# Patient Record
Sex: FEMALE | Race: BLACK OR AFRICAN AMERICAN | Employment: UNEMPLOYED | URBAN - METROPOLITAN AREA
[De-identification: names, ages, dates, MRNs, and addresses within clinical notes are randomized per-mention and may not be internally consistent; named-entity substitution may affect disease eponyms.]

---

## 2017-06-22 ENCOUNTER — HOSPITAL ENCOUNTER (OUTPATIENT)
Age: 52
Setting detail: SPECIMEN
Discharge: HOME | End: 2017-06-22
Payer: COMMERCIAL

## 2017-06-22 ENCOUNTER — OFFICE VISIT (OUTPATIENT)
Dept: INTERNAL MEDICINE | Age: 52
End: 2017-06-22
Payer: COMMERCIAL

## 2017-06-22 VITALS
SYSTOLIC BLOOD PRESSURE: 104 MMHG | WEIGHT: 219 LBS | BODY MASS INDEX: 33.19 KG/M2 | HEIGHT: 68 IN | DIASTOLIC BLOOD PRESSURE: 70 MMHG | HEART RATE: 77 BPM

## 2017-06-22 DIAGNOSIS — G47.09 OTHER INSOMNIA: ICD-10-CM

## 2017-06-22 DIAGNOSIS — F32.0 MILD MAJOR DEPRESSION (HCC): Primary | ICD-10-CM

## 2017-06-22 DIAGNOSIS — R79.89 LOW VITAMIN D LEVEL: ICD-10-CM

## 2017-06-22 DIAGNOSIS — Z13.31 POSITIVE DEPRESSION SCREENING: ICD-10-CM

## 2017-06-22 DIAGNOSIS — Z12.39 BREAST CANCER SCREENING: ICD-10-CM

## 2017-06-22 DIAGNOSIS — E78.00 PURE HYPERCHOLESTEROLEMIA: Chronic | ICD-10-CM

## 2017-06-22 DIAGNOSIS — N91.2 AMENORRHEA: ICD-10-CM

## 2017-06-22 DIAGNOSIS — F41.9 ANXIETY: ICD-10-CM

## 2017-06-22 LAB
ALBUMIN SERPL-MCNC: 4.2 G/DL (ref 3.5–5.2)
ALBUMIN/GLOBULIN RATIO: 1.1 (ref 1–2.5)
ALP BLD-CCNC: 86 U/L (ref 35–104)
ALT SERPL-CCNC: 9 U/L (ref 5–33)
ANION GAP SERPL CALCULATED.3IONS-SCNC: 14 MMOL/L (ref 9–17)
AST SERPL-CCNC: 17 U/L
BILIRUB SERPL-MCNC: 0.42 MG/DL (ref 0.3–1.2)
BUN BLDV-MCNC: 11 MG/DL (ref 6–20)
BUN/CREAT BLD: ABNORMAL (ref 9–20)
CALCIUM SERPL-MCNC: 9.1 MG/DL (ref 8.6–10.4)
CHLORIDE BLD-SCNC: 101 MMOL/L (ref 98–107)
CHOLESTEROL/HDL RATIO: 2.3
CHOLESTEROL: 200 MG/DL
CO2: 23 MMOL/L (ref 20–31)
CONTROL: NORMAL
CREAT SERPL-MCNC: 0.93 MG/DL (ref 0.5–0.9)
GFR AFRICAN AMERICAN: >60 ML/MIN
GFR NON-AFRICAN AMERICAN: >60 ML/MIN
GFR SERPL CREATININE-BSD FRML MDRD: ABNORMAL ML/MIN/{1.73_M2}
GFR SERPL CREATININE-BSD FRML MDRD: ABNORMAL ML/MIN/{1.73_M2}
GLUCOSE BLD-MCNC: 89 MG/DL (ref 70–99)
HDLC SERPL-MCNC: 86 MG/DL
LDL CHOLESTEROL: 99 MG/DL (ref 0–130)
POTASSIUM SERPL-SCNC: 4.3 MMOL/L (ref 3.7–5.3)
PREGNANCY TEST URINE, POC: NORMAL
SODIUM BLD-SCNC: 138 MMOL/L (ref 135–144)
TOTAL PROTEIN: 7.9 G/DL (ref 6.4–8.3)
TRIGL SERPL-MCNC: 77 MG/DL
VITAMIN D 25-HYDROXY: 28.6 NG/ML (ref 30–100)
VLDLC SERPL CALC-MCNC: ABNORMAL MG/DL (ref 1–30)

## 2017-06-22 PROCEDURE — 81025 URINE PREGNANCY TEST: CPT | Performed by: INTERNAL MEDICINE

## 2017-06-22 PROCEDURE — 80061 LIPID PANEL: CPT

## 2017-06-22 PROCEDURE — 82306 VITAMIN D 25 HYDROXY: CPT

## 2017-06-22 PROCEDURE — 99214 OFFICE O/P EST MOD 30 MIN: CPT | Performed by: INTERNAL MEDICINE

## 2017-06-22 PROCEDURE — 36415 COLL VENOUS BLD VENIPUNCTURE: CPT

## 2017-06-22 PROCEDURE — G8431 POS CLIN DEPRES SCRN F/U DOC: HCPCS | Performed by: INTERNAL MEDICINE

## 2017-06-22 PROCEDURE — 80053 COMPREHEN METABOLIC PANEL: CPT

## 2017-06-22 PROCEDURE — G0444 DEPRESSION SCREEN ANNUAL: HCPCS | Performed by: INTERNAL MEDICINE

## 2017-06-22 RX ORDER — FOLIC ACID 1 MG/1
1 TABLET ORAL DAILY
Qty: 30 TABLET | Refills: 5 | Status: SHIPPED | OUTPATIENT
Start: 2017-06-22 | End: 2018-10-02

## 2017-06-22 RX ORDER — PRAVASTATIN SODIUM 40 MG
40 TABLET ORAL DAILY
Qty: 30 TABLET | Refills: 6 | Status: SHIPPED | OUTPATIENT
Start: 2017-06-22 | End: 2017-10-04 | Stop reason: SDUPTHER

## 2017-06-22 RX ORDER — LORATADINE 10 MG/1
10 TABLET ORAL DAILY
Qty: 30 TABLET | Refills: 3 | Status: SHIPPED | OUTPATIENT
Start: 2017-06-22 | End: 2017-10-04 | Stop reason: SDUPTHER

## 2017-06-22 RX ORDER — ESCITALOPRAM OXALATE 20 MG/1
TABLET ORAL
Qty: 30 TABLET | Refills: 3 | Status: SHIPPED | OUTPATIENT
Start: 2017-06-22 | End: 2017-10-04 | Stop reason: SDUPTHER

## 2017-06-22 RX ORDER — LORATADINE AND PSEUDOEPHEDRINE 10; 240 MG/1; MG/1
1 TABLET, EXTENDED RELEASE ORAL DAILY
Qty: 30 TABLET | Refills: 3 | Status: CANCELLED | OUTPATIENT
Start: 2017-06-22

## 2017-06-22 RX ORDER — TRAZODONE HYDROCHLORIDE 100 MG/1
100 TABLET ORAL NIGHTLY
Qty: 30 TABLET | Refills: 3 | Status: SHIPPED | OUTPATIENT
Start: 2017-06-22 | End: 2017-10-04 | Stop reason: SDUPTHER

## 2017-06-22 ASSESSMENT — PATIENT HEALTH QUESTIONNAIRE - PHQ9
9. THOUGHTS THAT YOU WOULD BE BETTER OFF DEAD, OR OF HURTING YOURSELF: 0
10. IF YOU CHECKED OFF ANY PROBLEMS, HOW DIFFICULT HAVE THESE PROBLEMS MADE IT FOR YOU TO DO YOUR WORK, TAKE CARE OF THINGS AT HOME, OR GET ALONG WITH OTHER PEOPLE: 1
1. LITTLE INTEREST OR PLEASURE IN DOING THINGS: 0
7. TROUBLE CONCENTRATING ON THINGS, SUCH AS READING THE NEWSPAPER OR WATCHING TELEVISION: 3
5. POOR APPETITE OR OVEREATING: 0
6. FEELING BAD ABOUT YOURSELF - OR THAT YOU ARE A FAILURE OR HAVE LET YOURSELF OR YOUR FAMILY DOWN: 0
SUM OF ALL RESPONSES TO PHQ QUESTIONS 1-9: 9
4. FEELING TIRED OR HAVING LITTLE ENERGY: 3
SUM OF ALL RESPONSES TO PHQ9 QUESTIONS 1 & 2: 0
2. FEELING DOWN, DEPRESSED OR HOPELESS: 0
8. MOVING OR SPEAKING SO SLOWLY THAT OTHER PEOPLE COULD HAVE NOTICED. OR THE OPPOSITE, BEING SO FIGETY OR RESTLESS THAT YOU HAVE BEEN MOVING AROUND A LOT MORE THAN USUAL: 0
3. TROUBLE FALLING OR STAYING ASLEEP: 3

## 2017-06-25 ASSESSMENT — ENCOUNTER SYMPTOMS
SPUTUM PRODUCTION: 0
BLURRED VISION: 0
NAUSEA: 0
SHORTNESS OF BREATH: 0
ABDOMINAL PAIN: 0
CONSTIPATION: 0
COUGH: 0
BACK PAIN: 1
EYE REDNESS: 0

## 2017-08-26 ENCOUNTER — APPOINTMENT (OUTPATIENT)
Dept: GENERAL RADIOLOGY | Age: 52
End: 2017-08-26
Payer: COMMERCIAL

## 2017-08-26 ENCOUNTER — HOSPITAL ENCOUNTER (EMERGENCY)
Age: 52
Discharge: HOME | End: 2017-08-26
Payer: COMMERCIAL

## 2017-08-26 VITALS
BODY MASS INDEX: 32.89 KG/M2 | OXYGEN SATURATION: 100 % | SYSTOLIC BLOOD PRESSURE: 98 MMHG | RESPIRATION RATE: 20 BRPM | TEMPERATURE: 97.3 F | HEART RATE: 73 BPM | DIASTOLIC BLOOD PRESSURE: 64 MMHG | HEIGHT: 68 IN | WEIGHT: 217 LBS

## 2017-08-26 DIAGNOSIS — S93.401D SPRAIN OF RIGHT ANKLE, UNSPECIFIED LIGAMENT, SUBSEQUENT ENCOUNTER: Primary | ICD-10-CM

## 2017-08-26 PROCEDURE — 73630 X-RAY EXAM OF FOOT: CPT

## 2017-08-26 PROCEDURE — 6370000000 HC RX 637 (ALT 250 FOR IP)

## 2017-08-26 PROCEDURE — 73610 X-RAY EXAM OF ANKLE: CPT

## 2017-08-26 PROCEDURE — 99284 EMERGENCY DEPT VISIT MOD MDM: CPT

## 2017-08-26 RX ORDER — HYDROCODONE BITARTRATE AND ACETAMINOPHEN 5; 325 MG/1; MG/1
1 TABLET ORAL EVERY 6 HOURS PRN
Qty: 20 TABLET | Refills: 0 | Status: SHIPPED | OUTPATIENT
Start: 2017-08-26 | End: 2017-09-02

## 2017-08-26 RX ORDER — IBUPROFEN 600 MG/1
600 TABLET ORAL EVERY 6 HOURS PRN
Qty: 30 TABLET | Refills: 0 | Status: SHIPPED | OUTPATIENT
Start: 2017-08-26 | End: 2017-10-04 | Stop reason: ALTCHOICE

## 2017-08-26 RX ORDER — OXYCODONE HYDROCHLORIDE AND ACETAMINOPHEN 5; 325 MG/1; MG/1
2 TABLET ORAL ONCE
Status: COMPLETED | OUTPATIENT
Start: 2017-08-26 | End: 2017-08-26

## 2017-08-26 RX ADMIN — OXYCODONE HYDROCHLORIDE AND ACETAMINOPHEN 2 TABLET: 5; 325 TABLET ORAL at 18:18

## 2017-08-26 ASSESSMENT — PAIN SCALES - GENERAL
PAINLEVEL_OUTOF10: 9
PAINLEVEL_OUTOF10: 9
PAINLEVEL_OUTOF10: 7

## 2017-08-26 ASSESSMENT — PAIN DESCRIPTION - PAIN TYPE: TYPE: ACUTE PAIN

## 2017-08-26 ASSESSMENT — PAIN DESCRIPTION - LOCATION: LOCATION: ANKLE;FOOT

## 2017-08-26 ASSESSMENT — PAIN DESCRIPTION - DESCRIPTORS: DESCRIPTORS: DULL;THROBBING

## 2017-08-26 ASSESSMENT — PAIN SCALES - WONG BAKER: WONGBAKER_NUMERICALRESPONSE: 4

## 2017-08-26 ASSESSMENT — PAIN DESCRIPTION - FREQUENCY: FREQUENCY: CONTINUOUS

## 2017-08-26 ASSESSMENT — PAIN DESCRIPTION - ORIENTATION: ORIENTATION: RIGHT;LEFT

## 2017-08-29 ASSESSMENT — ENCOUNTER SYMPTOMS
PHOTOPHOBIA: 0
BACK PAIN: 1

## 2017-09-11 ENCOUNTER — HOSPITAL ENCOUNTER (OUTPATIENT)
Age: 52
Setting detail: SPECIMEN
Discharge: HOME | End: 2017-09-11
Payer: COMMERCIAL

## 2017-09-11 ENCOUNTER — OFFICE VISIT (OUTPATIENT)
Dept: OBGYN | Age: 52
End: 2017-09-11
Payer: COMMERCIAL

## 2017-09-11 VITALS
BODY MASS INDEX: 33.45 KG/M2 | DIASTOLIC BLOOD PRESSURE: 68 MMHG | WEIGHT: 220 LBS | HEART RATE: 63 BPM | SYSTOLIC BLOOD PRESSURE: 99 MMHG

## 2017-09-11 DIAGNOSIS — Z01.419 WOMEN'S ANNUAL ROUTINE GYNECOLOGICAL EXAMINATION: Primary | ICD-10-CM

## 2017-09-11 PROBLEM — S93.409A ANKLE SPRAIN: Status: ACTIVE | Noted: 2017-09-11

## 2017-09-11 LAB
DIRECT EXAM: NORMAL
Lab: NORMAL
SPECIMEN DESCRIPTION: NORMAL
STATUS: NORMAL

## 2017-09-11 PROCEDURE — G0101 CA SCREEN;PELVIC/BREAST EXAM: HCPCS | Performed by: STUDENT IN AN ORGANIZED HEALTH CARE EDUCATION/TRAINING PROGRAM

## 2017-09-11 RX ORDER — IBUPROFEN 600 MG/1
600 TABLET ORAL EVERY 6 HOURS PRN
COMMUNITY
End: 2017-10-04 | Stop reason: SDUPTHER

## 2017-09-11 RX ORDER — HYDROCODONE BITARTRATE AND ACETAMINOPHEN 5; 325 MG/1; MG/1
1 TABLET ORAL EVERY 6 HOURS PRN
COMMUNITY
End: 2017-10-04 | Stop reason: ALTCHOICE

## 2017-09-12 LAB
C TRACH DNA GENITAL QL NAA+PROBE: NEGATIVE
N. GONORRHOEAE DNA: NEGATIVE

## 2017-10-04 ENCOUNTER — OFFICE VISIT (OUTPATIENT)
Dept: INTERNAL MEDICINE | Age: 52
End: 2017-10-04
Payer: COMMERCIAL

## 2017-10-04 VITALS
DIASTOLIC BLOOD PRESSURE: 62 MMHG | WEIGHT: 215 LBS | SYSTOLIC BLOOD PRESSURE: 110 MMHG | HEART RATE: 63 BPM | HEIGHT: 68 IN | BODY MASS INDEX: 32.58 KG/M2

## 2017-10-04 DIAGNOSIS — E78.00 PURE HYPERCHOLESTEROLEMIA: Chronic | ICD-10-CM

## 2017-10-04 DIAGNOSIS — G47.00 INSOMNIA, UNSPECIFIED TYPE: ICD-10-CM

## 2017-10-04 DIAGNOSIS — F41.9 ANXIETY: ICD-10-CM

## 2017-10-04 DIAGNOSIS — M54.50 CHRONIC BILATERAL LOW BACK PAIN WITHOUT SCIATICA: ICD-10-CM

## 2017-10-04 DIAGNOSIS — G89.29 CHRONIC BILATERAL LOW BACK PAIN WITHOUT SCIATICA: ICD-10-CM

## 2017-10-04 DIAGNOSIS — E66.9 OBESITY (BMI 30-39.9): ICD-10-CM

## 2017-10-04 DIAGNOSIS — F32.0 MILD MAJOR DEPRESSION (HCC): Primary | ICD-10-CM

## 2017-10-04 PROCEDURE — 99213 OFFICE O/P EST LOW 20 MIN: CPT | Performed by: INTERNAL MEDICINE

## 2017-10-04 RX ORDER — ESCITALOPRAM OXALATE 20 MG/1
TABLET ORAL
Qty: 30 TABLET | Refills: 3 | Status: SHIPPED | OUTPATIENT
Start: 2017-10-04 | End: 2018-04-09 | Stop reason: SDUPTHER

## 2017-10-04 RX ORDER — TRAZODONE HYDROCHLORIDE 100 MG/1
100 TABLET ORAL NIGHTLY
Qty: 30 TABLET | Refills: 3 | Status: SHIPPED | OUTPATIENT
Start: 2017-10-04 | End: 2018-04-09 | Stop reason: SDUPTHER

## 2017-10-04 RX ORDER — LORATADINE 10 MG/1
10 TABLET ORAL DAILY
Qty: 30 TABLET | Refills: 3 | Status: SHIPPED | OUTPATIENT
Start: 2017-10-04 | End: 2018-10-02 | Stop reason: SDUPTHER

## 2017-10-04 RX ORDER — PRAVASTATIN SODIUM 40 MG
40 TABLET ORAL DAILY
Qty: 30 TABLET | Refills: 6 | Status: SHIPPED | OUTPATIENT
Start: 2017-10-04 | End: 2018-04-09 | Stop reason: SDUPTHER

## 2017-10-04 ASSESSMENT — ENCOUNTER SYMPTOMS
BACK PAIN: 1
CONSTIPATION: 0
ABDOMINAL PAIN: 0
COUGH: 0
BLURRED VISION: 0
EYE REDNESS: 0
NAUSEA: 0
SPUTUM PRODUCTION: 0
SHORTNESS OF BREATH: 0

## 2017-10-04 NOTE — PATIENT INSTRUCTIONS
OC-Light hemoccult collection kit given to patient and procedure explained. Your medications for this visit were escribed to your preferred pharmacy. Avs was given and reviewed appt card given with next appt.  MM

## 2017-10-04 NOTE — MR AVS SNAPSHOT
cancers. BMI is not perfect. It may overestimate body fat in athletes and people who are more muscular. Even a small weight loss (between 5 and 10 percent of your current weight) by decreasing your calorie intake and becoming more physically active will help lower your risk of developing or worsening diseases associated with obesity. Learn more at: [employer]ach[employer]uk          Instructions    OC-Light hemoccult collection kit given to patient and procedure explained. Your medications for this visit were escribed to your preferred pharmacy. Avs was given and reviewed appt card given with next appt. MM               Today's Medication Changes          These changes are accurate as of: 10/4/17  3:27 PM.  If you have any questions, ask your nurse or doctor. STOP taking these medications           HYDROcodone-acetaminophen 5-325 MG per tablet   Commonly known as:  Estill Ray by:  Timoteo Green MD       ibuprofen 600 MG tablet   Commonly known as:  ADVIL;MOTRIN   Stopped by:  Timoteo Green MD            Where to Get Your Medications      These medications were sent to [address] [place] [address] , [facility] [phone] - F [phone]  [facility], [address] [address]     Phone:  [phone]     escitalopram 20 MG tablet    loratadine 10 MG tablet    pravastatin 40 MG tablet    traZODone 100 MG tablet               Your Current Medications Are              escitalopram (LEXAPRO) 20 MG tablet TAKE 1 TABLET BY MOUTH DAILY.     pravastatin (PRAVACHOL) 40 MG tablet Take 1 tablet by mouth daily    traZODone (DESYREL) 100 MG tablet Take 1 tablet by mouth nightly    loratadine (CLARITIN) 10 MG tablet Take 1 tablet by mouth daily    Cholecalciferol 2000 units TABS Take 1 daily    folic acid (FOLVITE) 1 MG tablet Take 1 tablet by mouth daily      Allergies              Bactrim [Sulfamethoxazole-trimethoprim] Other (See Comments) changed, so think of one that is secure and easy to remember. 6. Create a [employer] password. You can change your password at any time. 7. Enter your Password Reset Question and Answer. This can be used at a later time if you forget your password. 8. Enter your e-mail address. You will receive e-mail notification when new information is available in [address] 9. Click Sign Up. You can now view your medical record. Additional Information  If you have questions, please contact the physician practice where you receive care. Remember, [employer] is NOT to be used for urgent needs. For medical emergencies, dial 911. For questions regarding your [employer] account call [phone]. If you have a clinical question, please call your doctor's office.

## 2017-10-04 NOTE — PROGRESS NOTES
insomnia. PHYSICAL EXAM:      Vitals:    10/04/17 1424   BP: 110/62   Site: Right Arm   Position: Sitting   Cuff Size: Small Adult   Pulse: 63   Weight: 215 lb (97.5 kg)   Height: 5' 8\" (1.727 m)     Body mass index is 32.69 kg/(m^2). BP Readings from Last 3 Encounters:   10/04/17 110/62   09/11/17 99/68   08/26/17 98/64        Wt Readings from Last 3 Encounters:   10/04/17 215 lb (97.5 kg)   09/11/17 220 lb (99.8 kg)   08/26/17 217 lb (98.4 kg)       Physical Exam      HENT:  Normocephalic, Atraumatic. Neck- Normal range of motion, No tenderness, Supple  Eyes:  PERRL, EOMI, Conjunctiva normal, No discharge. Respiratory:  Normal breath sounds, No respiratory distress, No wheezing, No chest tenderness. Cardiovascular:  Normal heart rate, Normal rhythm, No murmur  GI:  Bowel sounds normal, Soft, No tenderness  Musculoskeletal:  Intact distal pulses, No edema, No tenderness, Back- No tenderness. Integument:  Warm, Dry, No erythema, No rash. Lymphatic:  No lymphadenopathy noted.    Neurologic:  Alert & oriented x 3, Normal motor function, Normal sensory function    LABORATORY FINDINGS:    CBC:  Lab Results   Component Value Date    WBC 8.3 07/26/2016    HGB 12.1 07/26/2016     07/26/2016     05/05/2012     BMP:    Lab Results   Component Value Date     06/22/2017    K 4.3 06/22/2017     06/22/2017    CO2 23 06/22/2017    BUN 11 06/22/2017    CREATININE 0.93 06/22/2017    GLUCOSE 89 06/22/2017    GLUCOSE 76 05/05/2012     HEMOGLOBIN A1C: No results found for: LABA1C  MICROALBUMIN URINE: No results found for: MICROALBUR  FASTING LIPID PANEL:  Lab Results   Component Value Date    CHOL 200 (H) 06/22/2017    HDL 86 06/22/2017    TRIG 77 06/22/2017     Lab Results   Component Value Date    LDLCHOLESTEROL 99 06/22/2017       LIVER PROFILE:  Lab Results   Component Value Date    ALT 9 06/22/2017    AST 17 06/22/2017    PROT 7.9 06/22/2017    BILITOT 0.42 06/22/2017    BILIDIR <0.08

## 2017-10-17 LAB
CONTROL: NORMAL
HEMOCCULT STL QL: NORMAL

## 2017-10-19 DIAGNOSIS — Z12.11 COLON CANCER SCREENING: ICD-10-CM

## 2017-10-19 DIAGNOSIS — Z12.11 COLON CANCER SCREENING: Primary | ICD-10-CM

## 2017-10-19 PROCEDURE — 82274 ASSAY TEST FOR BLOOD FECAL: CPT | Performed by: INTERNAL MEDICINE

## 2018-03-27 DIAGNOSIS — Z13.31 POSITIVE DEPRESSION SCREENING: ICD-10-CM

## 2018-03-27 DIAGNOSIS — G47.09 OTHER INSOMNIA: ICD-10-CM

## 2018-03-27 DIAGNOSIS — F41.9 ANXIETY: ICD-10-CM

## 2018-03-27 DIAGNOSIS — F32.0 MILD MAJOR DEPRESSION (HCC): ICD-10-CM

## 2018-03-27 RX ORDER — ESCITALOPRAM OXALATE 20 MG/1
TABLET ORAL
Qty: 30 TABLET | Refills: 0 | Status: SHIPPED | OUTPATIENT
Start: 2018-03-27 | End: 2018-04-09 | Stop reason: SDUPTHER

## 2018-04-09 ENCOUNTER — OFFICE VISIT (OUTPATIENT)
Dept: INTERNAL MEDICINE | Age: 53
End: 2018-04-09
Payer: COMMERCIAL

## 2018-04-09 VITALS
BODY MASS INDEX: 31.07 KG/M2 | OXYGEN SATURATION: 100 % | SYSTOLIC BLOOD PRESSURE: 103 MMHG | DIASTOLIC BLOOD PRESSURE: 70 MMHG | TEMPERATURE: 97.8 F | HEART RATE: 71 BPM | HEIGHT: 68 IN | WEIGHT: 205 LBS

## 2018-04-09 DIAGNOSIS — E78.00 PURE HYPERCHOLESTEROLEMIA: Chronic | ICD-10-CM

## 2018-04-09 DIAGNOSIS — Z12.31 ENCOUNTER FOR SCREENING MAMMOGRAM FOR BREAST CANCER: ICD-10-CM

## 2018-04-09 DIAGNOSIS — F32.A MILD DEPRESSION: Primary | ICD-10-CM

## 2018-04-09 DIAGNOSIS — Z23 NEED FOR PROPHYLACTIC VACCINATION AND INOCULATION AGAINST VARICELLA: ICD-10-CM

## 2018-04-09 DIAGNOSIS — Z23 NEED FOR PROPHYLACTIC VACCINATION AGAINST DIPHTHERIA-TETANUS-PERTUSSIS (DTP): ICD-10-CM

## 2018-04-09 DIAGNOSIS — E66.09 CLASS 1 OBESITY DUE TO EXCESS CALORIES WITHOUT SERIOUS COMORBIDITY WITH BODY MASS INDEX (BMI) OF 31.0 TO 31.9 IN ADULT: ICD-10-CM

## 2018-04-09 DIAGNOSIS — F41.9 ANXIETY: ICD-10-CM

## 2018-04-09 DIAGNOSIS — G47.09 OTHER INSOMNIA: ICD-10-CM

## 2018-04-09 PROBLEM — S93.409A ANKLE SPRAIN: Status: RESOLVED | Noted: 2017-09-11 | Resolved: 2018-04-09

## 2018-04-09 PROCEDURE — 99213 OFFICE O/P EST LOW 20 MIN: CPT | Performed by: INTERNAL MEDICINE

## 2018-04-09 PROCEDURE — 99213 OFFICE O/P EST LOW 20 MIN: CPT

## 2018-04-09 RX ORDER — TRAZODONE HYDROCHLORIDE 100 MG/1
100 TABLET ORAL NIGHTLY
Qty: 30 TABLET | Refills: 3 | Status: SHIPPED | OUTPATIENT
Start: 2018-04-09 | End: 2018-10-02 | Stop reason: SDUPTHER

## 2018-04-09 RX ORDER — PRAVASTATIN SODIUM 40 MG
40 TABLET ORAL DAILY
Qty: 30 TABLET | Refills: 6 | Status: SHIPPED | OUTPATIENT
Start: 2018-04-09 | End: 2018-10-02 | Stop reason: SDUPTHER

## 2018-04-09 RX ORDER — ESCITALOPRAM OXALATE 20 MG/1
TABLET ORAL
Qty: 30 TABLET | Refills: 5 | Status: SHIPPED | OUTPATIENT
Start: 2018-04-09 | End: 2018-10-02 | Stop reason: SDUPTHER

## 2018-04-16 ASSESSMENT — ENCOUNTER SYMPTOMS
SPUTUM PRODUCTION: 0
ABDOMINAL PAIN: 0
COUGH: 0
NAUSEA: 0
CONSTIPATION: 0
BACK PAIN: 1
BLURRED VISION: 0
EYE REDNESS: 0
SINUS PAIN: 0
BLOOD IN STOOL: 0

## 2018-05-04 ENCOUNTER — HOSPITAL ENCOUNTER (OUTPATIENT)
Dept: MAMMOGRAPHY | Age: 53
Discharge: HOME | End: 2018-05-06
Payer: COMMERCIAL

## 2018-05-04 DIAGNOSIS — Z12.31 ENCOUNTER FOR SCREENING MAMMOGRAM FOR BREAST CANCER: ICD-10-CM

## 2018-05-04 PROCEDURE — 77067 SCR MAMMO BI INCL CAD: CPT

## 2018-10-02 ENCOUNTER — OFFICE VISIT (OUTPATIENT)
Dept: INTERNAL MEDICINE | Age: 53
End: 2018-10-02
Payer: COMMERCIAL

## 2018-10-02 ENCOUNTER — HOSPITAL ENCOUNTER (OUTPATIENT)
Age: 53
Setting detail: SPECIMEN
Discharge: HOME | End: 2018-10-02
Payer: COMMERCIAL

## 2018-10-02 VITALS
WEIGHT: 204 LBS | SYSTOLIC BLOOD PRESSURE: 101 MMHG | HEART RATE: 51 BPM | HEIGHT: 68 IN | DIASTOLIC BLOOD PRESSURE: 61 MMHG | BODY MASS INDEX: 30.92 KG/M2

## 2018-10-02 DIAGNOSIS — R79.89 LOW VITAMIN D LEVEL: ICD-10-CM

## 2018-10-02 DIAGNOSIS — G47.09 OTHER INSOMNIA: ICD-10-CM

## 2018-10-02 DIAGNOSIS — F32.A MILD DEPRESSION: ICD-10-CM

## 2018-10-02 DIAGNOSIS — F41.9 ANXIETY: ICD-10-CM

## 2018-10-02 DIAGNOSIS — E66.9 CLASS 1 OBESITY: ICD-10-CM

## 2018-10-02 DIAGNOSIS — E53.8 LOW FOLIC ACID: ICD-10-CM

## 2018-10-02 DIAGNOSIS — E78.00 PURE HYPERCHOLESTEROLEMIA: Primary | Chronic | ICD-10-CM

## 2018-10-02 DIAGNOSIS — E78.00 PURE HYPERCHOLESTEROLEMIA: Chronic | ICD-10-CM

## 2018-10-02 LAB
ABSOLUTE EOS #: 0.39 K/UL (ref 0–0.44)
ABSOLUTE IMMATURE GRANULOCYTE: <0.03 K/UL (ref 0–0.3)
ABSOLUTE LYMPH #: 2.6 K/UL (ref 1.1–3.7)
ABSOLUTE MONO #: 0.46 K/UL (ref 0.1–1.2)
ALBUMIN SERPL-MCNC: 4 G/DL (ref 3.5–5.2)
ALBUMIN/GLOBULIN RATIO: 1.2 (ref 1–2.5)
ALP BLD-CCNC: 73 U/L (ref 35–104)
ALT SERPL-CCNC: 7 U/L (ref 5–33)
ANION GAP SERPL CALCULATED.3IONS-SCNC: 14 MMOL/L (ref 9–17)
AST SERPL-CCNC: 15 U/L
BASOPHILS # BLD: 1 % (ref 0–2)
BASOPHILS ABSOLUTE: 0.09 K/UL (ref 0–0.2)
BILIRUB SERPL-MCNC: 0.27 MG/DL (ref 0.3–1.2)
BUN BLDV-MCNC: 9 MG/DL (ref 6–20)
BUN/CREAT BLD: ABNORMAL (ref 9–20)
CALCIUM SERPL-MCNC: 9.3 MG/DL (ref 8.6–10.4)
CHLORIDE BLD-SCNC: 105 MMOL/L (ref 98–107)
CHOLESTEROL/HDL RATIO: 3.3
CHOLESTEROL: 246 MG/DL
CO2: 26 MMOL/L (ref 20–31)
CREAT SERPL-MCNC: 0.93 MG/DL (ref 0.5–0.9)
DIFFERENTIAL TYPE: ABNORMAL
EOSINOPHILS RELATIVE PERCENT: 5 % (ref 1–4)
FOLATE: 4 NG/ML
GFR AFRICAN AMERICAN: >60 ML/MIN
GFR NON-AFRICAN AMERICAN: >60 ML/MIN
GFR SERPL CREATININE-BSD FRML MDRD: ABNORMAL ML/MIN/{1.73_M2}
GFR SERPL CREATININE-BSD FRML MDRD: ABNORMAL ML/MIN/{1.73_M2}
GLUCOSE BLD-MCNC: 83 MG/DL (ref 70–99)
HCT VFR BLD CALC: 37.4 % (ref 36.3–47.1)
HDLC SERPL-MCNC: 75 MG/DL
HEMOGLOBIN: 11.8 G/DL (ref 11.9–15.1)
IMMATURE GRANULOCYTES: 0 %
LDL CHOLESTEROL: 155 MG/DL (ref 0–130)
LYMPHOCYTES # BLD: 31 % (ref 24–43)
MCH RBC QN AUTO: 29.9 PG (ref 25.2–33.5)
MCHC RBC AUTO-ENTMCNC: 31.6 G/DL (ref 28.4–34.8)
MCV RBC AUTO: 94.7 FL (ref 82.6–102.9)
MONOCYTES # BLD: 5 % (ref 3–12)
NRBC AUTOMATED: 0 PER 100 WBC
PDW BLD-RTO: 13.4 % (ref 11.8–14.4)
PLATELET # BLD: 267 K/UL (ref 138–453)
PLATELET ESTIMATE: ABNORMAL
PMV BLD AUTO: 11.1 FL (ref 8.1–13.5)
POTASSIUM SERPL-SCNC: 4.1 MMOL/L (ref 3.7–5.3)
RBC # BLD: 3.95 M/UL (ref 3.95–5.11)
RBC # BLD: ABNORMAL 10*6/UL
SEG NEUTROPHILS: 58 % (ref 36–65)
SEGMENTED NEUTROPHILS ABSOLUTE COUNT: 4.91 K/UL (ref 1.5–8.1)
SODIUM BLD-SCNC: 145 MMOL/L (ref 135–144)
TOTAL PROTEIN: 7.4 G/DL (ref 6.4–8.3)
TRIGL SERPL-MCNC: 81 MG/DL
VITAMIN B-12: 325 PG/ML (ref 232–1245)
VLDLC SERPL CALC-MCNC: ABNORMAL MG/DL (ref 1–30)
WBC # BLD: 8.5 K/UL (ref 3.5–11.3)
WBC # BLD: ABNORMAL 10*3/UL

## 2018-10-02 PROCEDURE — 99211 OFF/OP EST MAY X REQ PHY/QHP: CPT | Performed by: INTERNAL MEDICINE

## 2018-10-02 PROCEDURE — 80061 LIPID PANEL: CPT

## 2018-10-02 PROCEDURE — 85025 COMPLETE CBC W/AUTO DIFF WBC: CPT

## 2018-10-02 PROCEDURE — 36415 COLL VENOUS BLD VENIPUNCTURE: CPT

## 2018-10-02 PROCEDURE — 82746 ASSAY OF FOLIC ACID SERUM: CPT

## 2018-10-02 PROCEDURE — 99213 OFFICE O/P EST LOW 20 MIN: CPT | Performed by: INTERNAL MEDICINE

## 2018-10-02 PROCEDURE — 80053 COMPREHEN METABOLIC PANEL: CPT

## 2018-10-02 PROCEDURE — 82607 VITAMIN B-12: CPT

## 2018-10-02 RX ORDER — PRAVASTATIN SODIUM 40 MG
40 TABLET ORAL DAILY
Qty: 30 TABLET | Refills: 6 | Status: SHIPPED | OUTPATIENT
Start: 2018-10-02 | End: 2019-04-24 | Stop reason: SDUPTHER

## 2018-10-02 RX ORDER — LORATADINE 10 MG/1
10 TABLET ORAL DAILY
Qty: 30 TABLET | Refills: 3 | Status: SHIPPED | OUTPATIENT
Start: 2018-10-02 | End: 2019-06-27 | Stop reason: SDUPTHER

## 2018-10-02 RX ORDER — TRAZODONE HYDROCHLORIDE 100 MG/1
100 TABLET ORAL NIGHTLY
Qty: 30 TABLET | Refills: 3 | Status: SHIPPED | OUTPATIENT
Start: 2018-10-02 | End: 2019-04-24 | Stop reason: SDUPTHER

## 2018-10-02 RX ORDER — ESCITALOPRAM OXALATE 20 MG/1
TABLET ORAL
Qty: 30 TABLET | Refills: 5 | Status: SHIPPED | OUTPATIENT
Start: 2018-10-02 | End: 2019-04-24 | Stop reason: SDUPTHER

## 2018-10-02 RX ORDER — FOLIC ACID 1 MG/1
1 TABLET ORAL DAILY
Qty: 30 TABLET | Refills: 3 | Status: SHIPPED | OUTPATIENT
Start: 2018-10-02 | End: 2019-04-24 | Stop reason: SDUPTHER

## 2018-10-02 ASSESSMENT — ENCOUNTER SYMPTOMS
BLOOD IN STOOL: 0
ABDOMINAL PAIN: 0
COUGH: 0
NAUSEA: 0
SHORTNESS OF BREATH: 0
CONSTIPATION: 0
SPUTUM PRODUCTION: 0
SINUS PAIN: 0
BACK PAIN: 1

## 2018-10-02 NOTE — PROGRESS NOTES
Visit Information    Have you changed or started any medications since your last visit including any over-the-counter medicines, vitamins, or herbal medicines? no   Are you having any side effects from any of your medications? -  no  Have you stopped taking any of your medications? Is so, why? -  no    Have you seen any other physician or provider since your last visit? No  Have you had any other diagnostic tests since your last visit? No  Have you been seen in the emergency room and/or had an admission to a hospital since we last saw you? No  Have you had your routine dental cleaning in the past 6 months? yes -     Have you activated your [employer] account? If not, what are your barriers?  No: declined     Patient Care Team:  Camryn Israel MD as PCP - Jaguar Mackenzie MD as PCP - [facility] Attributed Provider    Medical History Review  Past Medical, Family, and Social History reviewed and does contribute to the patient presenting condition    Health Maintenance   Topic Date Due    Shingles Vaccine (1 of 2 - 2 Dose Series) 07/25/2016    Flu vaccine (1) 09/01/2018    Colon Cancer Screen FIT/FOBT  10/17/2018    DTaP/Tdap/Td vaccine (1 - Tdap) 07/24/2024 (Originally 7/25/2014)    Breast cancer screen  05/04/2020    Cervical cancer screen  08/30/2021    Lipid screen  06/22/2022    HIV screen  Completed

## 2018-10-03 RX ORDER — LANOLIN ALCOHOL/MO/W.PET/CERES
1000 CREAM (GRAM) TOPICAL DAILY
Qty: 30 TABLET | Refills: 3 | Status: SHIPPED | OUTPATIENT
Start: 2018-10-03 | End: 2019-04-24 | Stop reason: SDUPTHER

## 2019-04-24 ENCOUNTER — OFFICE VISIT (OUTPATIENT)
Dept: INTERNAL MEDICINE | Age: 54
End: 2019-04-24
Payer: COMMERCIAL

## 2019-04-24 VITALS
HEART RATE: 71 BPM | SYSTOLIC BLOOD PRESSURE: 109 MMHG | HEIGHT: 68 IN | BODY MASS INDEX: 31.83 KG/M2 | WEIGHT: 210 LBS | DIASTOLIC BLOOD PRESSURE: 64 MMHG

## 2019-04-24 DIAGNOSIS — R79.89 LOW VITAMIN D LEVEL: ICD-10-CM

## 2019-04-24 DIAGNOSIS — G89.29 CHRONIC RIGHT-SIDED LOW BACK PAIN WITHOUT SCIATICA: Primary | ICD-10-CM

## 2019-04-24 DIAGNOSIS — G47.09 OTHER INSOMNIA: ICD-10-CM

## 2019-04-24 DIAGNOSIS — F32.A MILD DEPRESSION: ICD-10-CM

## 2019-04-24 DIAGNOSIS — E66.09 CLASS 1 OBESITY DUE TO EXCESS CALORIES WITHOUT SERIOUS COMORBIDITY WITH BODY MASS INDEX (BMI) OF 31.0 TO 31.9 IN ADULT: ICD-10-CM

## 2019-04-24 DIAGNOSIS — M54.50 CHRONIC RIGHT-SIDED LOW BACK PAIN WITHOUT SCIATICA: Primary | ICD-10-CM

## 2019-04-24 DIAGNOSIS — E53.8 LOW FOLIC ACID: ICD-10-CM

## 2019-04-24 DIAGNOSIS — F41.9 ANXIETY: ICD-10-CM

## 2019-04-24 DIAGNOSIS — E78.00 PURE HYPERCHOLESTEROLEMIA: ICD-10-CM

## 2019-04-24 PROCEDURE — 99214 OFFICE O/P EST MOD 30 MIN: CPT | Performed by: INTERNAL MEDICINE

## 2019-04-24 PROCEDURE — 99211 OFF/OP EST MAY X REQ PHY/QHP: CPT | Performed by: INTERNAL MEDICINE

## 2019-04-24 RX ORDER — FOLIC ACID 1 MG/1
1 TABLET ORAL DAILY
Qty: 30 TABLET | Refills: 3 | Status: SHIPPED | OUTPATIENT
Start: 2019-04-24 | End: 2019-11-05 | Stop reason: SDUPTHER

## 2019-04-24 RX ORDER — PRAVASTATIN SODIUM 40 MG
40 TABLET ORAL DAILY
Qty: 30 TABLET | Refills: 6 | Status: SHIPPED | OUTPATIENT
Start: 2019-04-24 | End: 2019-11-05 | Stop reason: SDUPTHER

## 2019-04-24 RX ORDER — TRAZODONE HYDROCHLORIDE 100 MG/1
100 TABLET ORAL NIGHTLY
Qty: 30 TABLET | Refills: 3 | Status: SHIPPED | OUTPATIENT
Start: 2019-04-24 | End: 2019-11-05 | Stop reason: SDUPTHER

## 2019-04-24 RX ORDER — LANOLIN ALCOHOL/MO/W.PET/CERES
1000 CREAM (GRAM) TOPICAL DAILY
Qty: 30 TABLET | Refills: 3 | Status: SHIPPED | OUTPATIENT
Start: 2019-04-24 | End: 2019-11-05 | Stop reason: SDUPTHER

## 2019-04-24 RX ORDER — IBUPROFEN 800 MG/1
800 TABLET ORAL 2 TIMES DAILY PRN
Qty: 60 TABLET | Refills: 0 | Status: SHIPPED | OUTPATIENT
Start: 2019-04-24 | End: 2019-06-27 | Stop reason: SDUPTHER

## 2019-04-24 RX ORDER — ESCITALOPRAM OXALATE 20 MG/1
TABLET ORAL
Qty: 30 TABLET | Refills: 5 | Status: SHIPPED | OUTPATIENT
Start: 2019-04-24 | End: 2019-11-05 | Stop reason: SDUPTHER

## 2019-04-24 NOTE — PROGRESS NOTES
[facility]    Progress Note    Date of patient's visit: 4/24/2019    Patient's Name:  Jana Alcocer    YOB: 1965            Patient Care Team:  Jenni Hameed MD as PCP - Nohelia Mosquera MD as PCP - [facility] Attributed Provider    REASON FOR VISIT: Routine outpatient follow     Chief Complaint   Patient presents with    Medication Refill    Back Pain     pt. has been having neck and shoulder pain also    Toe Pain         HISTORY OF PRESENT ILLNESS:    History was obtained from the patient. Jana Alcocer is a 47 y.o. is here for follow-up. Overall she is doing well. She is compliant with medications. She is having some worsening of her chronic low back pain. No radiculopathy. Pain is mostly localized in the right lower back. No falls. She agrees to go to physical therapy which has helped in the past.  She is taking ibuprofen once a day at night. She is taking all her other supplements except B12 which she did not receive. She states compliance with meds. She says depression and insomnia is controlled and she sleeping well.          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    KLAUDIA (stress urinary incontinence, female) 8/19/2014       Past Surgical History:   Procedure Laterality Date    ANUS SURGERY      cyst I and D    OTHER SURGICAL HISTORY      cervical biopsy    TONSILLECTOMY           ALLERGIES      Allergies   Allergen Reactions    Bactrim [Sulfamethoxazole-Trimethoprim] Other (See Comments)     Blood in stools       MEDICATIONS:      Current Outpatient Medications on File Prior to Visit   Medication Sig Dispense Refill    escitalopram (LEXAPRO) 20 MG tablet TAKE 1 TAB BY MOUTH ONCE A DAY 30 tablet 5    pravastatin (PRAVACHOL) 40 MG tablet Take 1 tablet by mouth daily 30 tablet 6    traZODone (DESYREL) 100 MG tablet Take 1 tablet by mouth nightly 30 tablet 3    Cholecalciferol 2000 units TABS Take 1 daily 30 tablet 5    loratadine (CLARITIN) 10 MG tablet Take 1 tablet by mouth daily 30 tablet 3    folic acid (FOLVITE) 1 MG tablet Take 1 tablet by mouth daily 30 tablet 3    vitamin B-12 (CYANOCOBALAMIN) 1000 MCG tablet Take 1 tablet by mouth daily 30 tablet 3     No current facility-administered medications on file prior to visit. SOCIAL HISTORY    Reviewed and no change from previous record. Marcelina Soriano  reports that she has never smoked. She has never used smokeless tobacco.    FAMILY HISTORY:    Reviewed and No change from previous visit    HEALTH MAINTENANCE DUE:      Health Maintenance Due   Topic Date Due    Shingles Vaccine (1 of 2) 07/25/2016    Colon Cancer Screen FIT/FOBT  10/17/2018       REVIEW OF SYSTEMS:    12 point review of symptoms completed and found to be normal except noted in the HPI    Review of Systems      Constitutional: Negative for fever, malaise/fatigue and weight loss. HENT: Positive for congestion. Negative for ear discharge, ear pain, hearing loss and sinus pain. Respiratory: Negative for cough, sputum production and shortness of breath. Cardiovascular: Negative for chest pain, palpitations and leg swelling. Gastrointestinal: Negative for abdominal pain, blood in stool, constipation and nausea. Musculoskeletal: Positive for back pain. Negative for joint pain, myalgias and neck pain. Neurological: Negative for dizziness, loss of consciousness and headaches. Endo/Heme/Allergies: Positive for environmental allergies. Negative for polydipsia. Does not bruise/bleed easily. Psychiatric/Behavioral: Positive for depression. Negative for substance abuse and suicidal ideas. The patient is nervous/anxious and has insomnia.       PHYSICAL EXAM:     Vitals: 04/24/19 1432   BP: 109/64   Site: Left Upper Arm   Position: Sitting   Cuff Size: Medium Adult   Pulse: 71   Weight: 210 lb (95.3 kg)   Height: 5' 8\" (1.727 m)     Body mass index is 31.93 kg/m². BP Readings from Last 3 Encounters:   04/24/19 109/64   10/02/18 101/61   04/09/18 103/70        Wt Readings from Last 3 Encounters:   04/24/19 210 lb (95.3 kg)   10/02/18 204 lb (92.5 kg)   04/09/18 205 lb (93 kg)       Physical Exam         HENT:  Normocephalic, Atraumatic. Neck- Normal range of motion, No tenderness, Supple  Eyes:  PERRL, EOMI, Conjunctiva normal, No discharge. Respiratory:  Normal breath sounds, No respiratory distress, No wheezing, No chest tenderness. Cardiovascular:  Normal heart rate, Normal rhythm, No murmur  GI:  Bowel sounds normal, Soft, No tenderness  Musculoskeletal:  Intact distal pulses, No edema, No tenderness, Back- No tenderness. Integument:  Warm, Dry, No erythema, No rash. Lymphatic:  No lymphadenopathy noted.    Neurologic:  Alert & oriented x 3, Normal motor function, Normal sensory function  Psychiatric:  Affect normal    LABORATORY FINDINGS:    CBC:  Lab Results   Component Value Date    WBC 8.5 10/02/2018    HGB 11.8 10/02/2018     10/02/2018     05/05/2012     BMP:    Lab Results   Component Value Date     10/02/2018    K 4.1 10/02/2018     10/02/2018    CO2 26 10/02/2018    BUN 9 10/02/2018    CREATININE 0.93 10/02/2018    GLUCOSE 83 10/02/2018    GLUCOSE 76 05/05/2012     HEMOGLOBIN A1C: No results found for: LABA1C  MICROALBUMIN URINE: No results found for: MICROALBUR  FASTING LIPID [email or web address]  Lab Results   Component Value Date    LDLCHOLESTEROL 155 (H) 10/02/2018       LIVER PROFILE:  Lab Results   Component Value Date    ALT 7 10/02/2018    AST 15 10/02/2018    PROT 7.4 10/02/2018    BILITOT 0.27 10/02/2018    BILIDIR <0.08 12/14/2016    LABALBU 4.0 10/02/2018    LABALBU 4.6 05/05/2012 THYROID FUNCTION:   Lab Results   Component Value Date    TSH 2.09 01/26/2016      URINEANALYSIS: No results found for: LABURIN  ASSESSMENT AND PLAN:    1. Chronic right-sided low back pain without sciatica    - XR LUMBAR SPINE (2-3 VIEWS); Future  - [facility] -  [place]  - XR HIP 2-3 VW W PELVIS RIGHT; Future  - ibuprofen (ADVIL;MOTRIN) 800 MG tablet; Take 1 tablet by mouth 2 times daily as needed for Pain  Dispense: 60 tablet; Refill: 0    2. Pure hypercholesterolemia    - Lipid Panel; Future  - Comprehensive Metabolic Panel; Future  - pravastatin (PRAVACHOL) 40 MG tablet; Take 1 tablet by mouth daily  Dispense: 30 tablet; Refill: 6    3. Low folic acid    - Vitamin B12 & Folate; Future  - CBC With Auto Differential; Future  - folic acid (FOLVITE) 1 MG tablet; Take 1 tablet by mouth daily  Dispense: 30 tablet; Refill: 3    4. Mild depression (HCC)    - escitalopram (LEXAPRO) 20 MG tablet; TAKE 1 TAB BY MOUTH ONCE A DAY  Dispense: 30 tablet; Refill: 5    5. Class 1 obesity due to excess calories without serious comorbidity with body mass index (BMI) of 31.0 to 31.9 in adult      6. Anxiety    - escitalopram (LEXAPRO) 20 MG tablet; TAKE 1 TAB BY MOUTH ONCE A DAY  Dispense: 30 tablet; Refill: 5    7. Other insomnia    - escitalopram (LEXAPRO) 20 MG tablet; TAKE 1 TAB BY MOUTH ONCE A DAY  Dispense: 30 tablet; Refill: 5  - traZODone (DESYREL) 100 MG tablet; Take 1 tablet by mouth nightly  Dispense: 30 tablet; Refill: 3    8. Low vitamin D level    - Cholecalciferol 2000 units TABS; Take 1 daily  Dispense: 30 tablet; Refill: 5  - Vitamin D 25 Hydroxy; Future          FOLLOW UP AND INSTRUCTIONS:   Return in about 6 months (around 10/24/2019). 1. Frank Middleton received counseling on the following healthy behaviors: nutrition, exercise and medication adherence    2. Reviewed prior labs and health maintenance. 3. Discussed use, benefit, and side effects of prescribed medications.   Barriers to medication compliance addressed. All patient questions answered. Pt voiced understanding.        Mike Carr  Attending Physician, [address] [place] [address], Internal Medicine Residency Program  [address] [place] [address]  4/24/2019, 3:03 PM

## 2019-04-24 NOTE — PROGRESS NOTES
Visit Information    Have you changed or started any medications since your last visit including any over-the-counter medicines, vitamins, or herbal medicines? Yes ibuprofen, arthritictylenol   Have you stopped taking any of your medications? Is so, why? -  no  Are you having any side effects from any of your medications? - no    Have you seen any other physician or provider since your last visit?  no   Have you had any other diagnostic tests since your last visit? yes - labs 10/2/18   Have you been seen in the emergency room and/or had an admission in a hospital since we last saw you?  no   Have you had your routine dental cleaning in the past 6 months?  no     Do you have an active MyChart account? If no, what is the barrier?   No:      Patient Care Team:  Jose F Pearson MD as PCP - Charu Berry MD as PCP - [facility] Attributed Provider    Medical History Review  Past Medical, Family, and Social History reviewed and does not contribute to the patient presenting condition    Health Maintenance   Topic Date Due    Shingles Vaccine (1 of 2) 07/25/2016    Colon Cancer Screen FIT/FOBT  10/17/2018    DTaP/Tdap/Td vaccine (1 - Tdap) 07/24/2024 (Originally 7/25/2014)    Flu vaccine (Season Ended) 09/01/2019    Breast cancer screen  05/04/2020    Cervical cancer screen  08/30/2021    Lipid screen  10/02/2023    HIV screen  Completed    Pneumococcal 0-64 years Vaccine  Aged Out

## 2019-05-08 ENCOUNTER — HOSPITAL ENCOUNTER (OUTPATIENT)
Age: 54
Setting detail: SPECIMEN
Discharge: HOME | End: 2019-05-08
Payer: COMMERCIAL

## 2019-05-08 ENCOUNTER — NURSE ONLY (OUTPATIENT)
Dept: INTERNAL MEDICINE | Age: 54
End: 2019-05-08
Payer: COMMERCIAL

## 2019-05-08 DIAGNOSIS — R79.89 LOW VITAMIN D LEVEL: ICD-10-CM

## 2019-05-08 DIAGNOSIS — Z12.11 COLON CANCER SCREENING: ICD-10-CM

## 2019-05-08 DIAGNOSIS — E78.00 PURE HYPERCHOLESTEROLEMIA: ICD-10-CM

## 2019-05-08 DIAGNOSIS — E53.8 LOW FOLIC ACID: ICD-10-CM

## 2019-05-08 DIAGNOSIS — Z12.11 COLON CANCER SCREENING: Primary | ICD-10-CM

## 2019-05-08 LAB
ABSOLUTE EOS #: 0.33 K/UL (ref 0–0.44)
ABSOLUTE IMMATURE GRANULOCYTE: <0.03 K/UL (ref 0–0.3)
ABSOLUTE LYMPH #: 2.56 K/UL (ref 1.1–3.7)
ABSOLUTE MONO #: 0.45 K/UL (ref 0.1–1.2)
ALBUMIN SERPL-MCNC: 4.3 G/DL (ref 3.5–5.2)
ALBUMIN/GLOBULIN RATIO: 1.2 (ref 1–2.5)
ALP BLD-CCNC: 78 U/L (ref 35–104)
ALT SERPL-CCNC: 10 U/L (ref 5–33)
ANION GAP SERPL CALCULATED.3IONS-SCNC: 16 MMOL/L (ref 9–17)
AST SERPL-CCNC: 20 U/L
BASOPHILS # BLD: 1 % (ref 0–2)
BASOPHILS ABSOLUTE: 0.08 K/UL (ref 0–0.2)
BILIRUB SERPL-MCNC: 0.28 MG/DL (ref 0.3–1.2)
BUN BLDV-MCNC: 12 MG/DL (ref 6–20)
BUN/CREAT BLD: ABNORMAL (ref 9–20)
CALCIUM SERPL-MCNC: 9.4 MG/DL (ref 8.6–10.4)
CHLORIDE BLD-SCNC: 102 MMOL/L (ref 98–107)
CHOLESTEROL/HDL RATIO: 2.4
CHOLESTEROL: 200 MG/DL
CO2: 24 MMOL/L (ref 20–31)
CONTROL: NORMAL
CREAT SERPL-MCNC: 0.8 MG/DL (ref 0.5–0.9)
DIFFERENTIAL TYPE: NORMAL
EOSINOPHILS RELATIVE PERCENT: 4 % (ref 1–4)
FOLATE: 14.9 NG/ML
GFR AFRICAN AMERICAN: >60 ML/MIN
GFR NON-AFRICAN AMERICAN: >60 ML/MIN
GFR SERPL CREATININE-BSD FRML MDRD: ABNORMAL ML/MIN/{1.73_M2}
GFR SERPL CREATININE-BSD FRML MDRD: ABNORMAL ML/MIN/{1.73_M2}
GLUCOSE BLD-MCNC: 85 MG/DL (ref 70–99)
HCT VFR BLD CALC: 39.1 % (ref 36.3–47.1)
HDLC SERPL-MCNC: 85 MG/DL
HEMOCCULT STL QL: NEGATIVE
HEMOGLOBIN: 12.2 G/DL (ref 11.9–15.1)
IMMATURE GRANULOCYTES: 0 %
LDL CHOLESTEROL: 97 MG/DL (ref 0–130)
LYMPHOCYTES # BLD: 27 % (ref 24–43)
MCH RBC QN AUTO: 29.5 PG (ref 25.2–33.5)
MCHC RBC AUTO-ENTMCNC: 31.2 G/DL (ref 28.4–34.8)
MCV RBC AUTO: 94.4 FL (ref 82.6–102.9)
MONOCYTES # BLD: 5 % (ref 3–12)
NRBC AUTOMATED: 0 PER 100 WBC
PDW BLD-RTO: 12.9 % (ref 11.8–14.4)
PLATELET # BLD: 290 K/UL (ref 138–453)
PLATELET ESTIMATE: NORMAL
PMV BLD AUTO: 11.7 FL (ref 8.1–13.5)
POTASSIUM SERPL-SCNC: 3.8 MMOL/L (ref 3.7–5.3)
RBC # BLD: 4.14 M/UL (ref 3.95–5.11)
RBC # BLD: NORMAL 10*6/UL
SEG NEUTROPHILS: 63 % (ref 36–65)
SEGMENTED NEUTROPHILS ABSOLUTE COUNT: 5.98 K/UL (ref 1.5–8.1)
SODIUM BLD-SCNC: 142 MMOL/L (ref 135–144)
TOTAL PROTEIN: 7.9 G/DL (ref 6.4–8.3)
TRIGL SERPL-MCNC: 92 MG/DL
VITAMIN B-12: 500 PG/ML (ref 232–1245)
VITAMIN D 25-HYDROXY: 35.9 NG/ML (ref 30–100)
VLDLC SERPL CALC-MCNC: ABNORMAL MG/DL (ref 1–30)
WBC # BLD: 9.4 K/UL (ref 3.5–11.3)
WBC # BLD: NORMAL 10*3/UL

## 2019-05-08 PROCEDURE — 36415 COLL VENOUS BLD VENIPUNCTURE: CPT

## 2019-05-08 PROCEDURE — 82274 ASSAY TEST FOR BLOOD FECAL: CPT

## 2019-05-08 PROCEDURE — 99211 OFF/OP EST MAY X REQ PHY/QHP: CPT | Performed by: INTERNAL MEDICINE

## 2019-05-08 PROCEDURE — 82607 VITAMIN B-12: CPT

## 2019-05-08 PROCEDURE — 85025 COMPLETE CBC W/AUTO DIFF WBC: CPT

## 2019-05-08 PROCEDURE — 80053 COMPREHEN METABOLIC PANEL: CPT

## 2019-05-08 PROCEDURE — 82306 VITAMIN D 25 HYDROXY: CPT

## 2019-05-08 PROCEDURE — 82746 ASSAY OF FOLIC ACID SERUM: CPT

## 2019-05-08 PROCEDURE — 80061 LIPID PANEL: CPT

## 2019-05-08 PROCEDURE — 99999 PR OFFICE/OUTPT VISIT,PROCEDURE ONLY: CPT | Performed by: INTERNAL MEDICINE

## 2019-05-21 ENCOUNTER — HOSPITAL ENCOUNTER (OUTPATIENT)
Dept: GENERAL RADIOLOGY | Age: 54
Discharge: HOME | End: 2019-05-23
Payer: COMMERCIAL

## 2019-05-21 ENCOUNTER — HOSPITAL ENCOUNTER (OUTPATIENT)
Age: 54
Discharge: HOME | End: 2019-05-23
Payer: COMMERCIAL

## 2019-05-21 DIAGNOSIS — G89.29 CHRONIC RIGHT-SIDED LOW BACK PAIN WITHOUT SCIATICA: ICD-10-CM

## 2019-05-21 DIAGNOSIS — M54.50 CHRONIC RIGHT-SIDED LOW BACK PAIN WITHOUT SCIATICA: ICD-10-CM

## 2019-05-21 PROCEDURE — 72100 X-RAY EXAM L-S SPINE 2/3 VWS: CPT

## 2019-05-21 PROCEDURE — 73502 X-RAY EXAM HIP UNI 2-3 VIEWS: CPT

## 2019-06-18 ENCOUNTER — TELEPHONE (OUTPATIENT)
Dept: INTERNAL MEDICINE | Age: 54
End: 2019-06-18

## 2019-06-18 NOTE — TELEPHONE ENCOUNTER
Patient calls requesting a referral  To:  [facility]   For: Neurology for [address] phone number: [phone]  Physicians fax number: [phone]  Date of appointment: n/a  Insurance: Kenrick Memos to leave a message if we call back yes

## 2019-06-19 NOTE — TELEPHONE ENCOUNTER
Patient said she doesn't have a family history of MS. She said she had been falling down for years due to her balance. Dr. Roya Montoya has ordered hip xray and cholecalciferol. She said these symptoms could also be associated with MS.     Writer offered her sooner appt then her scheduled October appt and she declined

## 2019-06-19 NOTE — TELEPHONE ENCOUNTER
Writer spoke with patient and she states she has a family history of MS and would like to be tested. States she has never seen Neurology before but was just concerned because of family history. States that why she asked to see Neuro, please advise.

## 2019-06-27 DIAGNOSIS — G89.29 CHRONIC RIGHT-SIDED LOW BACK PAIN WITHOUT SCIATICA: ICD-10-CM

## 2019-06-27 DIAGNOSIS — M54.50 CHRONIC RIGHT-SIDED LOW BACK PAIN WITHOUT SCIATICA: ICD-10-CM

## 2019-07-01 RX ORDER — LORATADINE 10 MG/1
TABLET ORAL
Qty: 30 TABLET | Refills: 0 | Status: SHIPPED | OUTPATIENT
Start: 2019-07-01 | End: 2020-05-13 | Stop reason: ALTCHOICE

## 2019-07-01 RX ORDER — IBUPROFEN 800 MG/1
TABLET ORAL
Qty: 60 TABLET | Refills: 0 | Status: SHIPPED | OUTPATIENT
Start: 2019-07-01 | End: 2019-11-05 | Stop reason: SDUPTHER

## 2019-07-02 ENCOUNTER — HOSPITAL ENCOUNTER (OUTPATIENT)
Dept: MAMMOGRAPHY | Age: 54
Discharge: HOME | End: 2019-07-04
Payer: COMMERCIAL

## 2019-07-02 DIAGNOSIS — Z12.31 ENCOUNTER FOR SCREENING MAMMOGRAM FOR BREAST CANCER: ICD-10-CM

## 2019-07-02 PROCEDURE — 77063 BREAST TOMOSYNTHESIS BI: CPT

## 2019-07-03 DIAGNOSIS — R92.8 ABNORMAL MAMMOGRAM: Primary | ICD-10-CM

## 2019-07-15 ENCOUNTER — HOSPITAL ENCOUNTER (OUTPATIENT)
Dept: MAMMOGRAPHY | Age: 54
Discharge: HOME | End: 2019-07-17
Payer: COMMERCIAL

## 2019-07-15 ENCOUNTER — HOSPITAL ENCOUNTER (OUTPATIENT)
Dept: ULTRASOUND IMAGING | Age: 54
Discharge: HOME | End: 2019-07-17
Payer: COMMERCIAL

## 2019-07-15 DIAGNOSIS — R92.8 ABNORMAL MAMMOGRAM: ICD-10-CM

## 2019-07-15 PROCEDURE — G0279 TOMOSYNTHESIS, MAMMO: HCPCS

## 2019-07-15 PROCEDURE — 76642 ULTRASOUND BREAST LIMITED: CPT

## 2019-07-16 DIAGNOSIS — R92.8 ABNORMAL MAMMOGRAM OF LEFT BREAST: Primary | ICD-10-CM

## 2019-07-24 ENCOUNTER — HOSPITAL ENCOUNTER (OUTPATIENT)
Dept: MAMMOGRAPHY | Age: 54
Discharge: HOME | End: 2019-07-26
Payer: COMMERCIAL

## 2019-07-24 VITALS — DIASTOLIC BLOOD PRESSURE: 63 MMHG | HEART RATE: 62 BPM | SYSTOLIC BLOOD PRESSURE: 99 MMHG

## 2019-07-24 DIAGNOSIS — R92.8 ABNORMAL MAMMOGRAM OF LEFT BREAST: ICD-10-CM

## 2019-07-24 DIAGNOSIS — Z98.890 STATUS POST BREAST BIOPSY: ICD-10-CM

## 2019-07-24 PROCEDURE — 77065 DX MAMMO INCL CAD UNI: CPT

## 2019-07-24 PROCEDURE — 2500000003 HC RX 250 WO HCPCS

## 2019-07-24 PROCEDURE — 88305 TISSUE EXAM BY PATHOLOGIST: CPT

## 2019-07-24 PROCEDURE — 19081 BX BREAST 1ST LESION STRTCTC: CPT

## 2019-07-26 DIAGNOSIS — R92.8 ABNORMAL MAMMOGRAM: Primary | ICD-10-CM

## 2019-07-26 LAB — SURGICAL PATHOLOGY REPORT: NORMAL

## 2019-09-17 ENCOUNTER — OFFICE VISIT (OUTPATIENT)
Dept: INTERNAL MEDICINE | Age: 54
End: 2019-09-17
Payer: COMMERCIAL

## 2019-09-17 VITALS
OXYGEN SATURATION: 99 % | HEIGHT: 68 IN | TEMPERATURE: 97.7 F | HEART RATE: 95 BPM | DIASTOLIC BLOOD PRESSURE: 60 MMHG | WEIGHT: 211 LBS | SYSTOLIC BLOOD PRESSURE: 111 MMHG | BODY MASS INDEX: 31.98 KG/M2 | RESPIRATION RATE: 16 BRPM

## 2019-09-17 DIAGNOSIS — Z23 NEED FOR PROPHYLACTIC VACCINATION AND INOCULATION AGAINST VARICELLA: ICD-10-CM

## 2019-09-17 DIAGNOSIS — M54.50 CHRONIC RIGHT-SIDED LOW BACK PAIN WITHOUT SCIATICA: ICD-10-CM

## 2019-09-17 DIAGNOSIS — G89.29 CHRONIC RIGHT-SIDED LOW BACK PAIN WITHOUT SCIATICA: ICD-10-CM

## 2019-09-17 DIAGNOSIS — Z00.00 ROUTINE GENERAL MEDICAL EXAMINATION AT A HEALTH CARE FACILITY: Primary | ICD-10-CM

## 2019-09-17 PROCEDURE — G0438 PPPS, INITIAL VISIT: HCPCS | Performed by: INTERNAL MEDICINE

## 2019-09-17 ASSESSMENT — LIFESTYLE VARIABLES
HOW OFTEN DURING THE LAST YEAR HAVE YOU NEEDED AN ALCOHOLIC DRINK FIRST THING IN THE MORNING TO GET YOURSELF GOING AFTER A NIGHT OF HEAVY DRINKING: 0
HOW OFTEN DURING THE LAST YEAR HAVE YOU FOUND THAT YOU WERE NOT ABLE TO STOP DRINKING ONCE YOU HAD STARTED: 0
HAS A RELATIVE, FRIEND, DOCTOR, OR ANOTHER HEALTH PROFESSIONAL EXPRESSED CONCERN ABOUT YOUR DRINKING OR SUGGESTED YOU CUT DOWN: 0
HOW OFTEN DURING THE LAST YEAR HAVE YOU BEEN UNABLE TO REMEMBER WHAT HAPPENED THE NIGHT BEFORE BECAUSE YOU HAD BEEN DRINKING: 0
AUDIT-C TOTAL SCORE: 2
HOW OFTEN DURING THE LAST YEAR HAVE YOU HAD A FEELING OF GUILT OR REMORSE AFTER DRINKING: 0
HOW MANY STANDARD DRINKS CONTAINING ALCOHOL DO YOU HAVE ON A TYPICAL DAY: 1
HOW OFTEN DO YOU HAVE SIX OR MORE DRINKS ON ONE OCCASION: 0
HAVE YOU OR SOMEONE ELSE BEEN INJURED AS A RESULT OF YOUR DRINKING: 0
AUDIT TOTAL SCORE: 2
HOW OFTEN DO YOU HAVE A DRINK CONTAINING ALCOHOL: 1
HOW OFTEN DURING THE LAST YEAR HAVE YOU FAILED TO DO WHAT WAS NORMALLY EXPECTED FROM YOU BECAUSE OF DRINKING: 0

## 2019-09-17 ASSESSMENT — PATIENT HEALTH QUESTIONNAIRE - PHQ9
SUM OF ALL RESPONSES TO PHQ QUESTIONS 1-9: 2
SUM OF ALL RESPONSES TO PHQ QUESTIONS 1-9: 2

## 2019-09-17 NOTE — PATIENT INSTRUCTIONS
Personalized Preventive Plan for Jenifer Langley - 9/17/2019  Medicare offers a range of preventive health benefits. Some of the tests and screenings are paid in full while other may be subject to a deductible, co-insurance, and/or copay. Some of these benefits include a comprehensive review of your medical history including lifestyle, illnesses that may run in your family, and various assessments and screenings as appropriate. After reviewing your medical record and screening and assessments performed today your provider may have ordered immunizations, labs, imaging, and/or referrals for you. A list of these orders (if applicable) as well as your Preventive Care list are included within your After Visit Summary for your review. Other Preventive Recommendations:    · A preventive eye exam performed by an eye specialist is recommended every 1-2 years to screen for glaucoma; cataracts, macular degeneration, and other eye disorders. · A preventive dental visit is recommended every 6 months. · Try to get at least 150 minutes of exercise per week or 10,000 steps per day on a pedometer . · Order or download the FREE \"Exercise & Physical Activity: Your Everyday Guide\" from [employer]. Call 3-416.934.5965 or search [employer] online. · You need 4708-1682 mg of calcium and 7699-8727 IU of vitamin D per day. It is possible to meet your calcium requirement with diet alone, but a vitamin D supplement is usually necessary to meet this goal.  · When exposed to the sun, use a sunscreen that protects against both UVA and UVB radiation with an SPF of 30 or greater. Reapply every 2 to 3 hours or after sweating, drying off with a towel, or swimming. · Always wear a seat belt when traveling in a car. Always wear a helmet when riding a bicycle or motorcycle.

## 2019-09-17 NOTE — PROGRESS NOTES
Interventions:  · Hearing concerns:  pt notices she has to turn up the tv volume more than she used to , would like to see audiologist or ENT in the summer when easier to get to appt's    Safety:  Safety  Do you have working smoke detectors?: Yes  Have all throw rugs been removed or fastened?: (!) No  Do you have non-slip mats or surfaces in all bathtubs/showers?: (!) No  Do all of your stairways have a railing or banister?: Yes  Are your doorways, halls and stairs free of clutter?: Yes  Do you always fasten your seatbelt when you are in a car?: Yes  Safety Interventions:  · Home safety tips provided    Personalized Preventive Plan   Current Health Maintenance Status  Immunization History   Administered Date(s) Administered    Td, unspecified formulation 07/24/2014        Health Maintenance   Topic Date Due    Shingles Vaccine (1 of 2) 07/25/2016    Colon Cancer Screen FIT/FOBT  10/17/2018    Annual Wellness Visit (AWV)  05/29/2019    Flu vaccine (1) 09/01/2019    DTaP/Tdap/Td vaccine (1 - Tdap) 07/24/2024 (Originally 7/25/2014)    Breast cancer screen  07/24/2021    Cervical cancer screen  08/30/2021    Lipid screen  05/08/2024    HIV screen  Completed    Pneumococcal 0-64 years Vaccine  Aged Out     Recommendations for [employer] Due: see orders and patient instructions/AVS.  . Recommended screening schedule for the next 5-10 years is provided to the patient in written form: see Patient Instructions/AVS.    Casie MCDUFFIE RN, 9/17/2019, performed the documented evaluation under the direct supervision of the attending physician.

## 2019-11-05 ENCOUNTER — OFFICE VISIT (OUTPATIENT)
Dept: INTERNAL MEDICINE | Age: 54
End: 2019-11-05
Payer: COMMERCIAL

## 2019-11-05 VITALS
DIASTOLIC BLOOD PRESSURE: 63 MMHG | SYSTOLIC BLOOD PRESSURE: 100 MMHG | TEMPERATURE: 97.3 F | WEIGHT: 210 LBS | BODY MASS INDEX: 31.83 KG/M2 | HEIGHT: 68 IN | HEART RATE: 68 BPM

## 2019-11-05 DIAGNOSIS — E78.00 PURE HYPERCHOLESTEROLEMIA: ICD-10-CM

## 2019-11-05 DIAGNOSIS — G47.09 OTHER INSOMNIA: ICD-10-CM

## 2019-11-05 DIAGNOSIS — M54.50 CHRONIC RIGHT-SIDED LOW BACK PAIN WITHOUT SCIATICA: ICD-10-CM

## 2019-11-05 DIAGNOSIS — R52 GENERALIZED BODY ACHES: Primary | ICD-10-CM

## 2019-11-05 DIAGNOSIS — R79.89 LOW VITAMIN D LEVEL: ICD-10-CM

## 2019-11-05 DIAGNOSIS — J06.9 VIRAL URI: ICD-10-CM

## 2019-11-05 DIAGNOSIS — G89.29 CHRONIC RIGHT-SIDED LOW BACK PAIN WITHOUT SCIATICA: ICD-10-CM

## 2019-11-05 DIAGNOSIS — E66.09 CLASS 1 OBESITY DUE TO EXCESS CALORIES WITHOUT SERIOUS COMORBIDITY WITH BODY MASS INDEX (BMI) OF 31.0 TO 31.9 IN ADULT: ICD-10-CM

## 2019-11-05 DIAGNOSIS — E53.8 LOW FOLIC ACID: ICD-10-CM

## 2019-11-05 DIAGNOSIS — F41.9 ANXIETY: ICD-10-CM

## 2019-11-05 DIAGNOSIS — F32.A MILD DEPRESSION: ICD-10-CM

## 2019-11-05 LAB
INFLUENZA A ANTIBODY: NORMAL
INFLUENZA B ANTIBODY: NORMAL
S PYO AG THROAT QL: NORMAL

## 2019-11-05 PROCEDURE — 99214 OFFICE O/P EST MOD 30 MIN: CPT | Performed by: INTERNAL MEDICINE

## 2019-11-05 PROCEDURE — 87880 STREP A ASSAY W/OPTIC: CPT | Performed by: INTERNAL MEDICINE

## 2019-11-05 PROCEDURE — 87804 INFLUENZA ASSAY W/OPTIC: CPT | Performed by: INTERNAL MEDICINE

## 2019-11-05 PROCEDURE — 99211 OFF/OP EST MAY X REQ PHY/QHP: CPT | Performed by: INTERNAL MEDICINE

## 2019-11-05 RX ORDER — IBUPROFEN 800 MG/1
TABLET ORAL
Qty: 60 TABLET | Refills: 0 | Status: SHIPPED | OUTPATIENT
Start: 2019-11-05 | End: 2020-01-22 | Stop reason: HOSPADM

## 2019-11-05 RX ORDER — LANOLIN ALCOHOL/MO/W.PET/CERES
1000 CREAM (GRAM) TOPICAL DAILY
Qty: 30 TABLET | Refills: 3 | Status: SHIPPED | OUTPATIENT
Start: 2019-11-05 | End: 2020-05-13 | Stop reason: SDUPTHER

## 2019-11-05 RX ORDER — FOLIC ACID 1 MG/1
1 TABLET ORAL DAILY
Qty: 30 TABLET | Refills: 3 | Status: SHIPPED | OUTPATIENT
Start: 2019-11-05 | End: 2020-05-13 | Stop reason: SDUPTHER

## 2019-11-05 RX ORDER — ESCITALOPRAM OXALATE 20 MG/1
TABLET ORAL
Qty: 30 TABLET | Refills: 5 | Status: SHIPPED | OUTPATIENT
Start: 2019-11-05 | End: 2020-05-13 | Stop reason: SDUPTHER

## 2019-11-05 RX ORDER — TRAZODONE HYDROCHLORIDE 100 MG/1
100 TABLET ORAL NIGHTLY
Qty: 30 TABLET | Refills: 3 | Status: SHIPPED | OUTPATIENT
Start: 2019-11-05 | End: 2020-05-13 | Stop reason: SDUPTHER

## 2019-11-05 RX ORDER — PRAVASTATIN SODIUM 40 MG
40 TABLET ORAL DAILY
Qty: 30 TABLET | Refills: 6 | Status: SHIPPED | OUTPATIENT
Start: 2019-11-05 | End: 2020-05-13 | Stop reason: SDUPTHER

## 2019-11-05 ASSESSMENT — ENCOUNTER SYMPTOMS
ABDOMINAL PAIN: 0
SHORTNESS OF BREATH: 0
EYE REDNESS: 0
SORE THROAT: 1
SINUS PRESSURE: 0
TROUBLE SWALLOWING: 0
DIARRHEA: 0
EYE ITCHING: 0
NAUSEA: 0
COUGH: 0
RHINORRHEA: 0
VOMITING: 0
SINUS PAIN: 0
WHEEZING: 0
VOICE CHANGE: 1
BACK PAIN: 1
EYE PAIN: 0

## 2019-12-11 ENCOUNTER — TELEPHONE (OUTPATIENT)
Dept: INTERNAL MEDICINE | Age: 54
End: 2019-12-11

## 2019-12-11 DIAGNOSIS — G89.29 CHRONIC RIGHT-SIDED LOW BACK PAIN WITHOUT SCIATICA: Primary | ICD-10-CM

## 2019-12-11 DIAGNOSIS — M54.50 CHRONIC RIGHT-SIDED LOW BACK PAIN WITHOUT SCIATICA: Primary | ICD-10-CM

## 2020-01-13 ENCOUNTER — HOSPITAL ENCOUNTER (OUTPATIENT)
Dept: PHYSICAL THERAPY | Age: 55
Setting detail: THERAPIES SERIES
Discharge: HOME | End: 2020-01-13
Payer: COMMERCIAL

## 2020-01-13 PROCEDURE — 97110 THERAPEUTIC EXERCISES: CPT

## 2020-01-13 PROCEDURE — 97161 PT EVAL LOW COMPLEX 20 MIN: CPT

## 2020-01-13 ASSESSMENT — PAIN DESCRIPTION - LOCATION: LOCATION: BACK;HIP

## 2020-01-13 ASSESSMENT — PAIN DESCRIPTION - ORIENTATION: ORIENTATION: RIGHT;LOWER

## 2020-01-13 ASSESSMENT — PAIN DESCRIPTION - FREQUENCY: FREQUENCY: CONTINUOUS

## 2020-01-13 ASSESSMENT — PAIN DESCRIPTION - PAIN TYPE: TYPE: CHRONIC PAIN

## 2020-01-13 ASSESSMENT — PAIN SCALES - GENERAL: PAINLEVEL_OUTOF10: 8

## 2020-01-13 ASSESSMENT — PAIN DESCRIPTION - DESCRIPTORS: DESCRIPTORS: CONSTANT

## 2020-01-13 NOTE — PROGRESS NOTES
[facility] Exercise Log  Aquatic, Hip & DLS Program- Phase 1    Date of Eval: 1/13/2020                               Primary PT:Mitesh  Diagnosis:chronic low back pain  Things to Focus On (goals): relieve pain  Surgical Precautions:  Medical Precautions:arthritis  [] C-9 dates  [] Occ Med   [x] Medicare   Please try hanging on deep end  End date 3/11/2020  Date        Visit #        Walk F/L/R        Marching        Squats        Step-Ups F/L        Heel-toe raises        SLR F/L/R        Knee/Flex/Ext        F/L Lunges        Kickboard Ex. Iso Abd. Push-pull        Paddling                Balance                        Stretches        Achllies        Hamstring                                .                 Pain Rating
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
## 2020-01-22 ENCOUNTER — HOSPITAL ENCOUNTER (EMERGENCY)
Age: 55
Discharge: HOME | End: 2020-01-22
Attending: EMERGENCY MEDICINE
Payer: COMMERCIAL

## 2020-01-22 VITALS
SYSTOLIC BLOOD PRESSURE: 131 MMHG | TEMPERATURE: 97.9 F | WEIGHT: 214.44 LBS | OXYGEN SATURATION: 99 % | BODY MASS INDEX: 32.5 KG/M2 | DIASTOLIC BLOOD PRESSURE: 67 MMHG | HEIGHT: 68 IN | RESPIRATION RATE: 14 BRPM | HEART RATE: 65 BPM

## 2020-01-22 PROCEDURE — 99283 EMERGENCY DEPT VISIT LOW MDM: CPT

## 2020-01-22 RX ORDER — NAPROXEN 500 MG/1
500 TABLET ORAL 2 TIMES DAILY WITH MEALS
Qty: 20 TABLET | Refills: 0 | Status: SHIPPED | OUTPATIENT
Start: 2020-01-22 | End: 2020-02-28

## 2020-01-22 RX ORDER — CEPHALEXIN 500 MG/1
500 CAPSULE ORAL 3 TIMES DAILY
Qty: 21 CAPSULE | Refills: 0 | Status: SHIPPED | OUTPATIENT
Start: 2020-01-22 | End: 2020-01-29

## 2020-01-22 RX ORDER — METHOCARBAMOL 750 MG/1
750 TABLET, FILM COATED ORAL 4 TIMES DAILY
Qty: 40 TABLET | Refills: 0 | Status: SHIPPED | OUTPATIENT
Start: 2020-01-22 | End: 2020-05-13 | Stop reason: SDUPTHER

## 2020-01-22 ASSESSMENT — PAIN SCALES - GENERAL: PAINLEVEL_OUTOF10: 3

## 2020-01-22 ASSESSMENT — PAIN DESCRIPTION - PAIN TYPE: TYPE: ACUTE PAIN

## 2020-01-22 NOTE — ED PROVIDER NOTES
[address] [place] [address] ED  [facility]      Pt Name: Karina Cohen  MRN: [id number]  [place] 1965  Date ofevaluation: 1/22/2020  Provider: Kathy Delong PA-C    CHIEF COMPLAINT       Chief Complaint   Patient presents with    Abscess         HISTORY OF PRESENT ILLNESS  (Location/Symptom, Timing/Onset, Context/Setting, Quality, Duration, Modifying Factors, Severity.)   Karina Cohen is a 49 y.o. female who presents to the emergency department with sores over the last couple weeks to her scalp. No fevers or chills. No nausea or vomiting. Reports right shoulder discomfort and right neck discomfort. Has a history of chronic neck pain. Still described as mild, sore, constant worse with movement and relieved with rest      Nursing Notes were reviewed.     ALLERGIES     Bactrim [sulfamethoxazole-trimethoprim]    CURRENT MEDICATIONS       Previous Medications    CHOLECALCIFEROL 50 MCG (2000 UT) TABS    Take 1 daily    ESCITALOPRAM (LEXAPRO) 20 MG TABLET    TAKE 1 TAB BY MOUTH ONCE A DAY    FOLIC ACID (FOLVITE) 1 MG TABLET    Take 1 tablet by mouth daily    LORATADINE (CLARITIN) 10 MG TABLET    TAKE 1 TAB BY MOUTH ONCE A DAY    LORATADINE-PSEUDOEPHEDRINE (CLARITIN-D 12 HOUR) 5-120 MG PER EXTENDED RELEASE TABLET    Take 1 tablet by mouth 2 times daily    PRAVASTATIN (PRAVACHOL) 40 MG TABLET    Take 1 tablet by mouth daily    TRAZODONE (DESYREL) 100 MG TABLET    Take 1 tablet by mouth nightly    VITAMIN B-12 (CYANOCOBALAMIN) 1000 MCG TABLET    Take 1 tablet by mouth daily       PAST MEDICAL HISTORY         Diagnosis Date    Ankle sprain 9/11/2017    Anxiety     ASCUS of cervix with negative high risk HPV 9/15/2016    Depression     Hyperlipidemia     Obesity     Osteoarthritis     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    KLAUDIA (stress urinary incontinence, female) 8/19/2014       SURGICAL HISTORY           Procedure Laterality Date    ANUS SURGERY cyst I and D    OTHER SURGICAL HISTORY      cervical biopsy    TONSILLECTOMY           FAMILY HISTORY           Problem Relation Age of Onset    Other Mother         osteomyelitis    Cancer Mother         neck cancer    Other Father         schizophrenia    COPD Father     Cancer Maternal Grandmother         ovarian cancer in 79    Breast Cancer Paternal Aunt      Family Status   Relation Name Status    Mother      Father      Brother  Alive    MGM      Brother  Alive    Brother  Alive    PAunt  (Not Specified)        SOCIAL HISTORY      reports that she has never smoked. She has never used smokeless tobacco. She reports current alcohol use. She reports that she does not use drugs. REVIEW OFSYSTEMS    (2-9 systems for level 4, 10 or more for level 5)   Review of Systems    Except as noted above the remainder of the review of systems was reviewed and negative. PHYSICAL EXAM    (up to 7 for level 4, 8 or more for level 5)     ED Triage Vitals   BP Temp Temp src Pulse Resp SpO2 Height Weight   20 1314 20 1314 -- 20 1314 20 1314 20 1314 20 1315 20 1315   131/67 97.9 °F (36.6 °C)  65 14 99 % 5' 8\" (1.727 m) 214 lb 7 oz (97.3 kg)      Physical Exam  Constitutional:       Appearance: She is well-developed. HENT:      Head: Normocephalic and atraumatic. Neck:      Musculoskeletal: Normal range of motion and neck supple. Muscular tenderness present. No spinous process tenderness. Cardiovascular:      Rate and Rhythm: Normal rate and regular rhythm. Pulmonary:      Effort: Pulmonary effort is normal.      Breath sounds: Normal breath sounds. Abdominal:      Palpations: Abdomen is soft. Musculoskeletal: Normal range of motion. Skin:     General: Skin is warm. Findings: No rash. Neurological:      Mental Status: She is alert and oriented to person, place, and time.    Psychiatric:         Behavior: Behavior normal. DIAGNOSTIC RESULTS     EKG: All EKG's are interpreted by the Emergency Department Physician who either signs or Co-signs this chart in the absence of a cardiologist.        RADIOLOGY:   Non-plain film images such as CT, Ultrasound and MRI are read by the radiologist. Plain radiographic images arevisualized and preliminarily interpreted by the emergency physician with the below findings:        Interpretation per the Radiologist below, if available at thetime of this note:          ED BEDSIDE ULTRASOUND:   Performed by ED Physician - none    LABS:  Labs Reviewed - No data to display    All other labs were within normal range or not returned as of this dictation. EMERGENCY DEPARTMENT COURSE and DIFFERENTIAL DIAGNOSIS/MDM:   Vitals:    Vitals:    01/22/20 1314 01/22/20 1315   BP: 131/67    Pulse: 65    Resp: 14    Temp: 97.9 °F (36.6 °C)    SpO2: 99%    Weight:  214 lb 7 oz (97.3 kg)   Height:  5' 8\" (1.727 m)     Will treat with Keflex and discharged home with Robaxin. CONSULTS:  None    PROCEDURES:  Procedures        FINAL IMPRESSION      1. Sore on scalp    2.  Cervical strain, acute, initial encounter          DISPOSITION/PLAN   DISPOSITION Decision To Discharge 01/22/2020 03:32:11 PM      PATIENTREFERRED TO:   Lashawn Briseno MD  [facility] [address] [phone]    In 3 days        DISCHARGE MEDICATIONS:     New Prescriptions    CEPHALEXIN (KEFLEX) 500 MG CAPSULE    Take 1 capsule by mouth 3 times daily for 7 days    METHOCARBAMOL (ROBAXIN-750) 750 MG TABLET    Take 1 tablet by mouth 4 times daily for 10 days    NAPROXEN (NAPROSYN) 500 MG TABLET    Take 1 tablet by mouth 2 times daily (with meals)           (Please note that portions of this note were completed with a voice recognition program.  Efforts were made to edit thedictations but occasionally words are mis-transcribed.)    ESTEBAN Morgan,

## 2020-01-22 NOTE — ED NOTES
Pt to ED with c/o lump to back of the head. Pt reports this has been an ongoing problem since she noticed it a few weeks ago. Denies pain, and denies drainage. Abscess hard to visualize due to hair, but can be felt with examination. Pt is alert and oriented x4, eupneic, skin warm & dry.         Soraya Mary RN  01/22/20 4650

## 2020-01-22 NOTE — ED PROVIDER NOTES
The patient was seen and examined by me in conjunction with the mid-level provider. I agree with his/her assessment and treatment plan. Patient is placed on an oral antibiotic.      Alice Chapa MD  01/22/20 9722

## 2020-01-27 ENCOUNTER — HOSPITAL ENCOUNTER (OUTPATIENT)
Dept: PHYSICAL THERAPY | Age: 55
Setting detail: THERAPIES SERIES
Discharge: HOME | End: 2020-01-27
Payer: COMMERCIAL

## 2020-01-27 PROCEDURE — 97113 AQUATIC THERAPY/EXERCISES: CPT

## 2020-01-27 ASSESSMENT — PAIN DESCRIPTION - PAIN TYPE: TYPE: CHRONIC PAIN

## 2020-01-27 ASSESSMENT — PAIN DESCRIPTION - LOCATION: LOCATION: BACK;HIP

## 2020-01-27 ASSESSMENT — PAIN DESCRIPTION - ORIENTATION: ORIENTATION: RIGHT;LOWER

## 2020-01-27 ASSESSMENT — PAIN DESCRIPTION - DESCRIPTORS: DESCRIPTORS: CONSTANT

## 2020-01-27 ASSESSMENT — PAIN SCALES - GENERAL: PAINLEVEL_OUTOF10: 5

## 2020-01-27 ASSESSMENT — PAIN DESCRIPTION - FREQUENCY: FREQUENCY: CONTINUOUS
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
## 2020-01-29 ENCOUNTER — HOSPITAL ENCOUNTER (OUTPATIENT)
Dept: PHYSICAL THERAPY | Age: 55
Setting detail: THERAPIES SERIES
Discharge: HOME | End: 2020-01-29
Payer: COMMERCIAL

## 2020-01-29 PROCEDURE — 97113 AQUATIC THERAPY/EXERCISES: CPT

## 2020-01-29 ASSESSMENT — PAIN DESCRIPTION - LOCATION: LOCATION: BACK;HIP

## 2020-01-29 ASSESSMENT — PAIN DESCRIPTION - PAIN TYPE: TYPE: CHRONIC PAIN

## 2020-01-29 ASSESSMENT — PAIN SCALES - GENERAL: PAINLEVEL_OUTOF10: 5

## 2020-01-29 ASSESSMENT — PAIN DESCRIPTION - DESCRIPTORS: DESCRIPTORS: CONSTANT

## 2020-01-29 ASSESSMENT — PAIN DESCRIPTION - ORIENTATION: ORIENTATION: RIGHT;LOWER

## 2020-01-29 ASSESSMENT — PAIN DESCRIPTION - FREQUENCY: FREQUENCY: CONTINUOUS

## 2020-02-03 ENCOUNTER — APPOINTMENT (OUTPATIENT)
Dept: PHYSICAL THERAPY | Age: 55
End: 2020-02-03
Payer: COMMERCIAL

## 2020-02-05 ENCOUNTER — HOSPITAL ENCOUNTER (OUTPATIENT)
Dept: PHYSICAL THERAPY | Age: 55
Setting detail: THERAPIES SERIES
Discharge: HOME | End: 2020-02-05
Payer: COMMERCIAL

## 2020-02-05 ENCOUNTER — APPOINTMENT (OUTPATIENT)
Dept: PHYSICAL THERAPY | Age: 55
End: 2020-02-05
Payer: COMMERCIAL

## 2020-02-05 PROCEDURE — 97113 AQUATIC THERAPY/EXERCISES: CPT

## 2020-02-05 ASSESSMENT — PAIN DESCRIPTION - FREQUENCY: FREQUENCY: CONTINUOUS

## 2020-02-05 ASSESSMENT — PAIN DESCRIPTION - PAIN TYPE: TYPE: CHRONIC PAIN

## 2020-02-05 ASSESSMENT — PAIN SCALES - GENERAL: PAINLEVEL_OUTOF10: 8

## 2020-02-05 ASSESSMENT — PAIN DESCRIPTION - LOCATION: LOCATION: BACK;HIP

## 2020-02-05 ASSESSMENT — PAIN DESCRIPTION - ORIENTATION: ORIENTATION: RIGHT;LOWER

## 2020-02-05 ASSESSMENT — PAIN DESCRIPTION - DESCRIPTORS: DESCRIPTORS: CONSTANT
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
## 2020-02-10 ENCOUNTER — HOSPITAL ENCOUNTER (OUTPATIENT)
Dept: PHYSICAL THERAPY | Age: 55
Setting detail: THERAPIES SERIES
Discharge: HOME | End: 2020-02-10
Payer: COMMERCIAL

## 2020-02-10 PROCEDURE — 97113 AQUATIC THERAPY/EXERCISES: CPT

## 2020-02-10 ASSESSMENT — PAIN DESCRIPTION - DESCRIPTORS: DESCRIPTORS: THROBBING

## 2020-02-10 ASSESSMENT — PAIN DESCRIPTION - ORIENTATION: ORIENTATION: RIGHT;LOWER

## 2020-02-10 ASSESSMENT — PAIN DESCRIPTION - FREQUENCY: FREQUENCY: CONTINUOUS

## 2020-02-10 ASSESSMENT — PAIN DESCRIPTION - LOCATION: LOCATION: BACK;HIP

## 2020-02-10 ASSESSMENT — PAIN SCALES - GENERAL: PAINLEVEL_OUTOF10: 9

## 2020-02-10 ASSESSMENT — PAIN DESCRIPTION - PAIN TYPE: TYPE: CHRONIC PAIN
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
## 2020-02-12 ENCOUNTER — HOSPITAL ENCOUNTER (OUTPATIENT)
Dept: PHYSICAL THERAPY | Age: 55
Setting detail: THERAPIES SERIES
Discharge: HOME | End: 2020-02-12
Payer: COMMERCIAL

## 2020-02-12 PROCEDURE — 97113 AQUATIC THERAPY/EXERCISES: CPT

## 2020-02-12 ASSESSMENT — PAIN SCALES - GENERAL: PAINLEVEL_OUTOF10: 9

## 2020-02-12 ASSESSMENT — PAIN DESCRIPTION - PAIN TYPE: TYPE: CHRONIC PAIN

## 2020-02-12 ASSESSMENT — PAIN DESCRIPTION - LOCATION: LOCATION: BACK;HIP

## 2020-02-12 ASSESSMENT — PAIN DESCRIPTION - DESCRIPTORS: DESCRIPTORS: THROBBING

## 2020-02-12 ASSESSMENT — PAIN DESCRIPTION - FREQUENCY: FREQUENCY: CONTINUOUS

## 2020-02-12 ASSESSMENT — PAIN DESCRIPTION - ORIENTATION: ORIENTATION: RIGHT;LOWER
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
## 2020-02-17 ENCOUNTER — HOSPITAL ENCOUNTER (OUTPATIENT)
Dept: PHYSICAL THERAPY | Age: 55
Setting detail: THERAPIES SERIES
Discharge: HOME | End: 2020-02-17
Payer: COMMERCIAL

## 2020-02-17 ENCOUNTER — APPOINTMENT (OUTPATIENT)
Dept: PHYSICAL THERAPY | Age: 55
End: 2020-02-17
Payer: COMMERCIAL

## 2020-02-17 PROCEDURE — 97113 AQUATIC THERAPY/EXERCISES: CPT

## 2020-02-17 ASSESSMENT — PAIN DESCRIPTION - FREQUENCY: FREQUENCY: CONTINUOUS

## 2020-02-17 ASSESSMENT — PAIN SCALES - GENERAL: PAINLEVEL_OUTOF10: 4

## 2020-02-17 ASSESSMENT — PAIN DESCRIPTION - PAIN TYPE: TYPE: CHRONIC PAIN

## 2020-02-17 ASSESSMENT — PAIN DESCRIPTION - LOCATION: LOCATION: BACK;HIP

## 2020-02-17 ASSESSMENT — PAIN DESCRIPTION - ORIENTATION: ORIENTATION: RIGHT;LOWER

## 2020-02-17 ASSESSMENT — PAIN DESCRIPTION - DESCRIPTORS: DESCRIPTORS: SORE
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
## 2020-02-19 ENCOUNTER — APPOINTMENT (OUTPATIENT)
Dept: PHYSICAL THERAPY | Age: 55
End: 2020-02-19
Payer: COMMERCIAL

## 2020-02-19 ENCOUNTER — HOSPITAL ENCOUNTER (OUTPATIENT)
Dept: PHYSICAL THERAPY | Age: 55
Setting detail: THERAPIES SERIES
Discharge: HOME | End: 2020-02-19
Payer: COMMERCIAL

## 2020-02-19 PROCEDURE — 97113 AQUATIC THERAPY/EXERCISES: CPT

## 2020-02-19 ASSESSMENT — PAIN DESCRIPTION - FREQUENCY: FREQUENCY: CONTINUOUS

## 2020-02-19 ASSESSMENT — PAIN DESCRIPTION - LOCATION: LOCATION: BACK;HIP

## 2020-02-19 ASSESSMENT — PAIN DESCRIPTION - PAIN TYPE: TYPE: CHRONIC PAIN

## 2020-02-19 ASSESSMENT — PAIN SCALES - GENERAL: PAINLEVEL_OUTOF10: 7

## 2020-02-19 ASSESSMENT — PAIN DESCRIPTION - DESCRIPTORS: DESCRIPTORS: CONSTANT

## 2020-02-19 ASSESSMENT — PAIN DESCRIPTION - ORIENTATION: ORIENTATION: LOWER;RIGHT
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
## 2020-02-24 ENCOUNTER — APPOINTMENT (OUTPATIENT)
Dept: PHYSICAL THERAPY | Age: 55
End: 2020-02-24
Payer: COMMERCIAL

## 2020-02-26 ENCOUNTER — HOSPITAL ENCOUNTER (OUTPATIENT)
Dept: PHYSICAL THERAPY | Age: 55
Setting detail: THERAPIES SERIES
Discharge: HOME | End: 2020-02-26
Payer: COMMERCIAL

## 2020-02-26 PROCEDURE — 97113 AQUATIC THERAPY/EXERCISES: CPT

## 2020-02-26 ASSESSMENT — PAIN SCALES - GENERAL: PAINLEVEL_OUTOF10: 5

## 2020-02-26 ASSESSMENT — PAIN DESCRIPTION - FREQUENCY: FREQUENCY: CONTINUOUS

## 2020-02-26 ASSESSMENT — PAIN DESCRIPTION - ORIENTATION: ORIENTATION: LOWER;RIGHT

## 2020-02-26 ASSESSMENT — PAIN DESCRIPTION - DESCRIPTORS: DESCRIPTORS: CONSTANT

## 2020-02-26 ASSESSMENT — PAIN DESCRIPTION - PAIN TYPE: TYPE: CHRONIC PAIN

## 2020-02-26 ASSESSMENT — PAIN DESCRIPTION - LOCATION: LOCATION: BACK;HIP
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
## 2020-02-28 ENCOUNTER — APPOINTMENT (OUTPATIENT)
Dept: GENERAL RADIOLOGY | Age: 55
End: 2020-02-28
Payer: COMMERCIAL

## 2020-02-28 ENCOUNTER — HOSPITAL ENCOUNTER (EMERGENCY)
Age: 55
Discharge: HOME | End: 2020-02-28
Attending: EMERGENCY MEDICINE
Payer: COMMERCIAL

## 2020-02-28 VITALS
RESPIRATION RATE: 16 BRPM | WEIGHT: 215 LBS | SYSTOLIC BLOOD PRESSURE: 132 MMHG | OXYGEN SATURATION: 100 % | BODY MASS INDEX: 32.58 KG/M2 | HEIGHT: 68 IN | DIASTOLIC BLOOD PRESSURE: 52 MMHG | HEART RATE: 100 BPM | TEMPERATURE: 97.8 F

## 2020-02-28 PROCEDURE — 99284 EMERGENCY DEPT VISIT MOD MDM: CPT

## 2020-02-28 PROCEDURE — 72040 X-RAY EXAM NECK SPINE 2-3 VW: CPT

## 2020-02-28 PROCEDURE — 73502 X-RAY EXAM HIP UNI 2-3 VIEWS: CPT

## 2020-02-28 PROCEDURE — 72100 X-RAY EXAM L-S SPINE 2/3 VWS: CPT

## 2020-02-28 RX ORDER — IBUPROFEN 800 MG/1
800 TABLET ORAL EVERY 8 HOURS PRN
Qty: 20 TABLET | Refills: 0 | Status: SHIPPED | OUTPATIENT
Start: 2020-02-28 | End: 2020-05-13 | Stop reason: ALTCHOICE

## 2020-02-28 ASSESSMENT — PAIN DESCRIPTION - LOCATION: LOCATION: GENERALIZED

## 2020-02-28 ASSESSMENT — PAIN DESCRIPTION - DESCRIPTORS: DESCRIPTORS: ACHING;SORE

## 2020-02-28 ASSESSMENT — ENCOUNTER SYMPTOMS
SHORTNESS OF BREATH: 0
ABDOMINAL PAIN: 0
EYE PAIN: 0
COLOR CHANGE: 0
BACK PAIN: 1
PHOTOPHOBIA: 0
NAUSEA: 0
VOMITING: 0

## 2020-02-28 ASSESSMENT — PAIN DESCRIPTION - ORIENTATION: ORIENTATION: LEFT

## 2020-02-28 ASSESSMENT — PAIN DESCRIPTION - PAIN TYPE: TYPE: ACUTE PAIN

## 2020-02-28 ASSESSMENT — PAIN SCALES - GENERAL: PAINLEVEL_OUTOF10: 8

## 2020-02-28 NOTE — ED PROVIDER NOTES
9/15/2016    Depression     Hyperlipidemia     Obesity     Osteoarthritis     Pituitary tumor     prolacinoma- treated medically    KLAUDIA (stress urinary incontinence, female) 2014       SURGICAL HISTORY           Procedure Laterality Date    ANUS SURGERY      cyst I and D    OTHER SURGICAL HISTORY      cervical biopsy    TONSILLECTOMY           FAMILY HISTORY           Problem Relation Age of Onset    Other Mother         osteomyelitis    Cancer Mother         neck cancer    Other Father         schizophrenia    COPD Father     Cancer Maternal Grandmother         ovarian cancer in 79    Breast Cancer Paternal Aunt      Family Status   Relation Name Status    Mother      Father      Brother  Alive    MGM      Brother  Alive    Brother  Alive    PAunt  (Not Specified)        SOCIAL HISTORY      reports that she has never smoked. She has never used smokeless tobacco. She reports current alcohol use. She reports that she does not use drugs. REVIEW OF SYSTEMS    (2-9 systems for level 4, 10 or more for level 5)     Review of Systems   Constitutional: Negative for chills, diaphoresis, fatigue and fever. Eyes: Negative for photophobia, pain and visual disturbance. Respiratory: Negative for shortness of breath. Cardiovascular: Negative for chest pain. Gastrointestinal: Negative for abdominal pain, nausea and vomiting. Musculoskeletal: Positive for arthralgias, back pain, myalgias and neck pain. Negative for gait problem. Skin: Negative for color change, rash and wound. Neurological: Negative for dizziness, syncope, facial asymmetry, speech difficulty, weakness, light-headedness, numbness and headaches. Except as noted above the remainder of the review of systems was reviewed and negative.      PHYSICAL EXAM    (up to 7 for level 4, 8 or more for level 5)     ED Triage Vitals [20 1847]   BP Temp Temp Source Pulse Resp SpO2 Height Weight   -- 97.8 °F (36.6 °C) Oral 100 16 100 % 5' 8\" (1.727 m) 215 lb (97.5 kg)     Physical Exam  Vitals signs reviewed. Constitutional:       General: She is not in acute distress. Appearance: She is well-developed. She is not diaphoretic. HENT:      Head: Atraumatic. No raccoon eyes or Lopez's sign. Mouth/Throat:      Mouth: Mucous membranes are moist.      Pharynx: Oropharynx is clear. Eyes:      Extraocular Movements: Extraocular movements intact. Conjunctiva/sclera: Conjunctivae normal.      Pupils: Pupils are equal, round, and reactive to light. Cardiovascular:      Pulses: Normal pulses. Pulmonary:      Effort: Pulmonary effort is normal. No respiratory distress. Abdominal:      Palpations: Abdomen is soft. Musculoskeletal:      Left shoulder: She exhibits normal range of motion, no tenderness, no bony tenderness and no deformity. Left elbow: She exhibits normal range of motion, no swelling and no deformity. No tenderness found. Left wrist: She exhibits normal range of motion, no tenderness, no bony tenderness, no swelling and no deformity. Left hip: She exhibits tenderness. She exhibits no bony tenderness, no swelling and no deformity. Left knee: She exhibits normal range of motion and no swelling. No tenderness found. Left ankle: She exhibits normal range of motion, no swelling, no deformity and normal pulse. Cervical back: She exhibits tenderness and pain. She exhibits no bony tenderness, no swelling and no deformity. Thoracic back: She exhibits no tenderness, no bony tenderness and no pain. Lumbar back: She exhibits tenderness and pain. She exhibits no bony tenderness, no swelling and no deformity. Left hand: She exhibits normal range of motion, normal capillary refill, no deformity and no swelling. Normal sensation noted. Skin:     General: Skin is warm and dry. Capillary Refill: Capillary refill takes less than 2 seconds. [id number]  [phone]    If symptoms worsen      DISCHARGE MEDICATIONS:     Discharge Medication List as of 2/28/2020  8:13 PM      START taking these medications    Details   ibuprofen (ADVIL;MOTRIN) 800 MG tablet Take 1 tablet by mouth every 8 hours as needed for Pain, Disp-20 tablet, R-0Print                 (Please note that portions of this note were completed with a voice recognition program.  Efforts were made to edit the dictations but occasionally words are mis-transcribed.)    Geraldine Maurice, [address] [facility], APRN - CNP  02/29/20 [id number]

## 2020-02-29 NOTE — ED PROVIDER NOTES
The patient was seen and examined by me in conjunction with the mid-level provider. I agree with his/her assessment and treatment plan. Xr Cervical Spine (2-3 Views)    Result Date: 2/28/2020  EXAMINATION: 3 XRAY VIEWS OF THE CERVICAL SPINE 2/28/2020 7:17 pm COMPARISON: None. HISTORY: ORDERING SYSTEM PROVIDED HISTORY: pain TECHNOLOGIST PROVIDED HISTORY: pain Reason for Exam: fall Acuity: Acute Type of Exam: Initial FINDINGS: All 7 cervical vertebrae are visualized and appear normal in height and alignment. There is no evidence of prevertebral soft tissue edema or fracture. The base of the odontoid appears intact. No acute abnormality of the cervical spine. Xr Lumbar Spine (2-3 Views)    Result Date: 2/28/2020  EXAMINATION: THREE XRAY VIEWS OF THE LUMBAR SPINE 2/28/2020 7:17 pm COMPARISON: None. HISTORY: ORDERING SYSTEM PROVIDED HISTORY: pain, fall TECHNOLOGIST PROVIDED HISTORY: pain, fall Reason for Exam: fall Acuity: Acute Type of Exam: Initial FINDINGS: Lumbar vertebral bodies are normal in height and alignment. No evidence of fracture. Visualized sacrum is unremarkable. No significant degenerative changes. There is transitional lumbosacral anatomy. Unremarkable examination of the lumbar spine. Xr Hip 2-3 Vw W Pelvis Left    Result Date: 2/28/2020  EXAMINATION: ONE XRAY VIEW OF THE PELVIS AND TWO XRAY VIEWS LEFT HIP 2/28/2020 7:17 pm COMPARISON: AP pelvis 05/21/2019, lumbar series 05/21/2019 HISTORY: ORDERING SYSTEM PROVIDED HISTORY: pain, fall TECHNOLOGIST PROVIDED HISTORY: pain, fall Reason for Exam: fall Acuity: Acute Type of Exam: Initial FINDINGS: Unremarkable appearance of the right and left hemipelvis, articulating normally at the SI joints and pubic symphysis. Incidentally noted is sacral is a shin left side of L5. Visualized portions of the proximal left femur appear intact. Left femoral head aligns normally at the right acetabulum. Left hip joint appears well maintained. Unremarkable AP pelvis and left hip radiographs. If pain persists or worsens, then additional evaluation with MRI is indicated to ensure no underlying radiographically occult process such as fracture, AVN or transient osteoporosis. Medical Decision Making: X-rays are negative per radiologist and she will be treated symptomatically. Treatment diagnosis and follow-up were discussed with the patient. CONSULTS:  None    PROCEDURES:  None    FINAL IMPRESSION      1.  Fall, initial encounter         DISPOSITION/PLAN   DISPOSITION Decision To Discharge 02/28/2020 08:13:15 PM       PATIENT REFERRED TO:   MD Hardy Tamez [facility] [place] [address] [facility] [address] [facility] [address]  [phone]      As needed    [facility] ED  [address] [facility]  [phone]    If symptoms worsen      DISCHARGE MEDICATIONS:     New Prescriptions    IBUPROFEN (ADVIL;MOTRIN) 800 MG TABLET    Take 1 tablet by mouth every 8 hours as needed for Pain         (Please note that portions of this note were completed with a voice recognition program.  Efforts were made to edit the dictations but occasionally words are mis-transcribed.)    Yang Liriano MD  Attending Emergency Physician         Yang Liriano MD  02/28/20 2014

## 2020-03-04 ENCOUNTER — HOSPITAL ENCOUNTER (OUTPATIENT)
Dept: PHYSICAL THERAPY | Age: 55
Setting detail: THERAPIES SERIES
Discharge: HOME | End: 2020-03-04
Payer: COMMERCIAL

## 2020-03-04 PROCEDURE — 97113 AQUATIC THERAPY/EXERCISES: CPT

## 2020-03-04 ASSESSMENT — PAIN DESCRIPTION - PAIN TYPE: TYPE: CHRONIC PAIN

## 2020-03-04 ASSESSMENT — PAIN SCALES - GENERAL: PAINLEVEL_OUTOF10: 5

## 2020-03-04 ASSESSMENT — PAIN DESCRIPTION - FREQUENCY: FREQUENCY: CONTINUOUS

## 2020-03-04 ASSESSMENT — PAIN DESCRIPTION - DESCRIPTORS: DESCRIPTORS: CONSTANT

## 2020-03-04 ASSESSMENT — PAIN DESCRIPTION - ORIENTATION: ORIENTATION: LOWER;RIGHT

## 2020-03-04 ASSESSMENT — PAIN DESCRIPTION - LOCATION: LOCATION: BACK;HIP

## 2020-03-04 NOTE — PROGRESS NOTES
(goals): relieve pain  Surgical Precautions:  Medical Precautions:arthritis  []?????? C-9 dates  []?????? Occ Med   [x]?????? Medicare      End date 3/11/2020  Date  2/17/20 2/19/20 2/26/20 3/4/2020   Visit #  7/12 8/12 9/12 10/12   Walk F/L/R  2 each 2 Laps ea 2 Laps ea 2 Laps ea   Marching   2 laps 2 Laps 2 Laps  2 Laps    Squats 12x5\" 12x5\" 12x5\" 12x5\"   Step-Ups F/L Low box   12x ea  Low Box  12x ea Low Box   12x ea Low Box  12x ea   Heel-toe raises 12x 12x 12x 12x    SLR F/L/R  12x 12x 12x ea 12x ea   Knee/Flex/Ext  10x 10x 12x 12x   F/L Lunges        Kickboard Ex.  small Medium Medium Medium   Iso Abd.  12x5\" 12x5\" 12x5\" 12x5\"   Push-pull  12x 12x 12x 12x   Paddling                 Deep water         Hang   5' 5' 5' 5'            Stretches        Achllies        Hamstring   2x20\" 2x20\" 2x20\" 2x20\"                              .        Cool Down   2 laps 2 Laps 2 Laps 2 Laps   Pain Rating  4-5 6-7 5 5      Comment:  Comments: Focused on technique of all exercises per log with an emphasis on core stabilization and posture    Assessment: Body structures, Functions, Activity limitations: Decreased functional mobility ; Decreased ADL status; Decreased ROM; Increased pain  Prognosis: Good  REQUIRES PT FOLLOW UP: Yes  Activity Tolerance: Patient Tolerated treatment well    Plan:  Plan: Continue with current plan    Therapy Time:  Time In: 1423  Time Out: 1505  Minutes: 42  Timed Code Treatment Minutes: 32 Minutes    Treatment Charges: Minutes Units   []  Ultrasound     []  Electrical-Stim     []  Iontophoresis     []  Traction     []  Massage       []  Eval     []  Gait     []  Vasopneumatic Device     []  Ther Exercise       []  Manual Therapy       []  Ther Activities       [x]  Aquatics 32 2   []  Neuro Re-Ed       []  Other       Total Treatment Time: 32 2     Electronically signed by Diana Dawson PTA on 3/4/2020 at 4:57 PM

## 2020-03-09 ENCOUNTER — HOSPITAL ENCOUNTER (OUTPATIENT)
Dept: PHYSICAL THERAPY | Age: 55
Setting detail: THERAPIES SERIES
Discharge: HOME | End: 2020-03-09
Payer: COMMERCIAL

## 2020-03-09 PROCEDURE — 97113 AQUATIC THERAPY/EXERCISES: CPT

## 2020-03-09 ASSESSMENT — PAIN DESCRIPTION - ORIENTATION: ORIENTATION: LOWER;RIGHT

## 2020-03-09 ASSESSMENT — PAIN DESCRIPTION - FREQUENCY: FREQUENCY: CONTINUOUS

## 2020-03-09 ASSESSMENT — PAIN DESCRIPTION - DESCRIPTORS: DESCRIPTORS: CONSTANT

## 2020-03-09 ASSESSMENT — PAIN DESCRIPTION - PAIN TYPE: TYPE: CHRONIC PAIN

## 2020-03-09 ASSESSMENT — PAIN DESCRIPTION - LOCATION: LOCATION: BACK;HIP

## 2020-03-09 ASSESSMENT — PAIN SCALES - GENERAL: PAINLEVEL_OUTOF10: 5
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
## 2020-03-11 ENCOUNTER — HOSPITAL ENCOUNTER (OUTPATIENT)
Dept: PHYSICAL THERAPY | Age: 55
Setting detail: THERAPIES SERIES
Discharge: HOME | End: 2020-03-11
Payer: COMMERCIAL

## 2020-03-11 PROCEDURE — 97110 THERAPEUTIC EXERCISES: CPT

## 2020-03-11 PROCEDURE — 97113 AQUATIC THERAPY/EXERCISES: CPT

## 2020-03-11 ASSESSMENT — PAIN DESCRIPTION - ORIENTATION
ORIENTATION: LOWER
ORIENTATION: LOWER

## 2020-03-11 ASSESSMENT — PAIN SCALES - GENERAL
PAINLEVEL_OUTOF10: 7
PAINLEVEL_OUTOF10: 6

## 2020-03-11 ASSESSMENT — PAIN DESCRIPTION - FREQUENCY: FREQUENCY: CONTINUOUS

## 2020-03-11 ASSESSMENT — PAIN DESCRIPTION - DESCRIPTORS
DESCRIPTORS: CONSTANT
DESCRIPTORS: CONSTANT

## 2020-03-11 ASSESSMENT — PAIN DESCRIPTION - LOCATION
LOCATION: BACK
LOCATION: BACK

## 2020-04-21 ENCOUNTER — TELEPHONE (OUTPATIENT)
Dept: INTERNAL MEDICINE | Age: 55
End: 2020-04-21

## 2020-05-13 ENCOUNTER — VIRTUAL VISIT (OUTPATIENT)
Dept: INTERNAL MEDICINE | Age: 55
End: 2020-05-13
Payer: COMMERCIAL

## 2020-05-13 PROCEDURE — 99442 PR PHYS/QHP TELEPHONE EVALUATION 11-20 MIN: CPT | Performed by: INTERNAL MEDICINE

## 2020-05-13 RX ORDER — PRAVASTATIN SODIUM 40 MG
40 TABLET ORAL DAILY
Qty: 30 TABLET | Refills: 5 | Status: SHIPPED | OUTPATIENT
Start: 2020-05-13 | End: 2020-09-08 | Stop reason: SDUPTHER

## 2020-05-13 RX ORDER — LORATADINE AND PSEUDOEPHEDRINE SULFATE 5; 120 MG/1; MG/1
1 TABLET, EXTENDED RELEASE ORAL 2 TIMES DAILY
Qty: 20 TABLET | Refills: 1 | Status: SHIPPED | OUTPATIENT
Start: 2020-05-13 | End: 2022-04-05

## 2020-05-13 RX ORDER — LANOLIN ALCOHOL/MO/W.PET/CERES
1000 CREAM (GRAM) TOPICAL DAILY
Qty: 30 TABLET | Refills: 5 | Status: SHIPPED | OUTPATIENT
Start: 2020-05-13 | End: 2020-09-08 | Stop reason: SDUPTHER

## 2020-05-13 RX ORDER — ESCITALOPRAM OXALATE 20 MG/1
TABLET ORAL
Qty: 30 TABLET | Refills: 5 | Status: SHIPPED | OUTPATIENT
Start: 2020-05-13 | End: 2020-09-08 | Stop reason: SDUPTHER

## 2020-05-13 RX ORDER — METHOCARBAMOL 750 MG/1
750 TABLET, FILM COATED ORAL 3 TIMES DAILY PRN
Qty: 30 TABLET | Refills: 1 | Status: SHIPPED | OUTPATIENT
Start: 2020-05-13 | End: 2020-05-23

## 2020-05-13 RX ORDER — TRAZODONE HYDROCHLORIDE 100 MG/1
100 TABLET ORAL NIGHTLY
Qty: 30 TABLET | Refills: 5 | Status: SHIPPED | OUTPATIENT
Start: 2020-05-13 | End: 2020-09-08 | Stop reason: SDUPTHER

## 2020-05-13 RX ORDER — FOLIC ACID 1 MG/1
1 TABLET ORAL DAILY
Qty: 30 TABLET | Refills: 5 | Status: SHIPPED | OUTPATIENT
Start: 2020-05-13 | End: 2020-09-08 | Stop reason: SDUPTHER

## 2020-05-13 NOTE — PROGRESS NOTES
Brett Martínez is a 49 y.o. female evaluated via telephone on 5/13/2020. Consent:  She and/or health care decision maker is aware that that she may receive a bill for this telephone service, depending on her insurance coverage, and has provided verbal consent to proceed: Yes      Documentation:  I communicated with the patient and/or health care decision maker about her chronic medical problems. She is complaining of nasal congestion and coughing and sneezing. She has seasonal allergies. It gets worse in the spring and fall. She is requesting refills on loratadine. No fever or chills or sore throat. Depression and anxiety stable. She is taking all her medications. She needs labs done. She is requesting refills on all her medications. .   Details of this discussion including any medical advice provided:   Continue current meds. Labs ordered. Follow-up in 3 months time. I affirm this is a Patient Initiated Episode with a Patient who has not had a related appointment within my department in the past 7 days or scheduled within the next 24 hours.     Patient identification was verified at the start of the visit: Yes    Total Time: minutes: 11-20 minutes    Note: not billable if this call serves to triage the patient into an appointment for the relevant concern      Riki Ni

## 2020-07-23 RX ORDER — CHOLECALCIFEROL (VITAMIN D3) 50 MCG
TABLET ORAL
Qty: 30 TABLET | Refills: 2 | Status: SHIPPED | OUTPATIENT
Start: 2020-07-23 | End: 2020-09-08 | Stop reason: SDUPTHER

## 2020-09-08 ENCOUNTER — VIRTUAL VISIT (OUTPATIENT)
Dept: INTERNAL MEDICINE | Age: 55
End: 2020-09-08
Payer: COMMERCIAL

## 2020-09-08 PROCEDURE — 99442 PR PHYS/QHP TELEPHONE EVALUATION 11-20 MIN: CPT | Performed by: INTERNAL MEDICINE

## 2020-09-08 RX ORDER — CHOLECALCIFEROL (VITAMIN D3) 50 MCG
2000 TABLET ORAL DAILY
Qty: 30 TABLET | Refills: 5 | Status: SHIPPED | OUTPATIENT
Start: 2020-09-08 | End: 2021-09-23

## 2020-09-08 RX ORDER — PRAVASTATIN SODIUM 40 MG
40 TABLET ORAL DAILY
Qty: 30 TABLET | Refills: 5 | Status: SHIPPED | OUTPATIENT
Start: 2020-09-08 | End: 2022-04-04 | Stop reason: SDUPTHER

## 2020-09-08 RX ORDER — LORATADINE AND PSEUDOEPHEDRINE SULFATE 5; 120 MG/1; MG/1
1 TABLET, EXTENDED RELEASE ORAL 2 TIMES DAILY
Qty: 20 TABLET | Refills: 5 | Status: CANCELLED | OUTPATIENT
Start: 2020-09-08

## 2020-09-08 RX ORDER — LANOLIN ALCOHOL/MO/W.PET/CERES
1000 CREAM (GRAM) TOPICAL DAILY
Qty: 30 TABLET | Refills: 5 | Status: SHIPPED | OUTPATIENT
Start: 2020-09-08

## 2020-09-08 RX ORDER — FOLIC ACID 1 MG/1
1 TABLET ORAL DAILY
Qty: 30 TABLET | Refills: 5 | Status: SHIPPED | OUTPATIENT
Start: 2020-09-08 | End: 2021-09-23

## 2020-09-08 RX ORDER — ESCITALOPRAM OXALATE 20 MG/1
TABLET ORAL
Qty: 30 TABLET | Refills: 5 | Status: SHIPPED | OUTPATIENT
Start: 2020-09-08 | End: 2022-04-04 | Stop reason: SDUPTHER

## 2020-09-08 RX ORDER — TRAZODONE HYDROCHLORIDE 100 MG/1
100 TABLET ORAL NIGHTLY
Qty: 30 TABLET | Refills: 5 | Status: SHIPPED | OUTPATIENT
Start: 2020-09-08 | End: 2022-04-14 | Stop reason: SDUPTHER

## 2020-09-08 NOTE — PATIENT INSTRUCTIONS
-Pt due for 6 month f/u in March-- pt to call in February to set up an appt--reminder in [facility] to contact patient as well--AVS given to patient    -Bloodwork orders given to patient, they will have them done before their next visit.     -Venita Huerta

## 2020-09-18 ENCOUNTER — VIRTUAL VISIT (OUTPATIENT)
Dept: INTERNAL MEDICINE | Age: 55
End: 2020-09-18
Payer: COMMERCIAL

## 2020-09-18 PROCEDURE — 96160 PT-FOCUSED HLTH RISK ASSMT: CPT | Performed by: NURSE PRACTITIONER

## 2020-09-18 PROCEDURE — G0439 PPPS, SUBSEQ VISIT: HCPCS | Performed by: NURSE PRACTITIONER

## 2020-09-18 ASSESSMENT — LIFESTYLE VARIABLES
HOW OFTEN DO YOU HAVE A DRINK CONTAINING ALCOHOL: 1
HOW MANY STANDARD DRINKS CONTAINING ALCOHOL DO YOU HAVE ON A TYPICAL DAY: 0
AUDIT-C TOTAL SCORE: 1
HOW OFTEN DO YOU HAVE SIX OR MORE DRINKS ON ONE OCCASION: 0

## 2020-09-18 ASSESSMENT — PATIENT HEALTH QUESTIONNAIRE - PHQ9
SUM OF ALL RESPONSES TO PHQ9 QUESTIONS 1 & 2: 0
SUM OF ALL RESPONSES TO PHQ QUESTIONS 1-9: 0
1. LITTLE INTEREST OR PLEASURE IN DOING THINGS: 0
SUM OF ALL RESPONSES TO PHQ QUESTIONS 1-9: 0
2. FEELING DOWN, DEPRESSED OR HOPELESS: 0

## 2020-09-18 NOTE — PROGRESS NOTES
Medicare Annual Wellness Visit  Are Name: Sharona Rosales Date: 2020   MRN: [id number] Sex: Female   Age: 48 y.o. Ethnicity: Non-/Non    : 1965 Race: Vincent Abrams is here for Medicare AWV and Health Maintenance (has cologuard at home, flu vaccination unavailable)    Screenings for behavioral, psychosocial and functional/safety risks, and cognitive dysfunction are all negative except as indicated below. These results, as well as other patient data from the [address] form, are documented in Flowsheets linked to this Encounter. Allergies   Allergen Reactions    Bactrim [Sulfamethoxazole-Trimethoprim] Other (See Comments)     Blood in stools         Prior to Visit Medications    Medication Sig Taking?  Authorizing Provider   vitamin D (CHOLECALCIFEROL) 50 MCG ( UT) TABS tablet Take 1 tablet by mouth daily Yes Mariana Doan MD   vitamin B-12 (CYANOCOBALAMIN) 1000 MCG tablet Take 1 tablet by mouth daily Yes Mariana Doan MD   folic acid (FOLVITE) 1 MG tablet Take 1 tablet by mouth daily Yes Mariana Doan MD   traZODone (DESYREL) 100 MG tablet Take 1 tablet by mouth nightly Yes Mariana Doan MD   pravastatin (PRAVACHOL) 40 MG tablet Take 1 tablet by mouth daily Yes Mariana Doan MD   escitalopram (LEXAPRO) 20 MG tablet TAKE 1 TAB BY MOUTH ONCE A DAY Yes Mariana Doan MD   loratadine-pseudoephedrine (CLARITIN-D 12 HOUR) 5-120 MG per extended release tablet Take 1 tablet by mouth 2 times daily Yes Mariana Doan MD         Past Medical History:   Diagnosis Date    Ankle sprain 2017    Anxiety     ASCUS of cervix with negative high risk HPV 9/15/2016    Depression     Hyperlipidemia     Obesity     Osteoarthritis     Pituitary tumor     prolacinoma- treated medically    KLAUDIA (stress urinary incontinence, female) 2014       Past Surgical History:   Procedure Laterality Date    ANUS SURGERY      cyst I and D  OTHER SURGICAL HISTORY      cervical biopsy    TONSILLECTOMY           Family History   Problem Relation Age of Onset    Other Mother         osteomyelitis    Cancer Mother         neck cancer    Other Father         schizophrenia    COPD Father     Cancer Maternal Grandmother         ovarian cancer in 79    Breast Cancer Paternal Aunt        CareTeam (Including outside providers/suppliers regularly involved in providing care):   Patient Care Team:  Ulices Lopes MD as PCP - Yonis Martinez MD as PCP - [facility] Empaneled Provider    Wt Readings from Last 3 Encounters:   02/28/20 215 lb (97.5 kg)   01/22/20 214 lb 7 oz (97.3 kg)   11/05/19 210 lb (95.3 kg)      No flowsheet data found. There is no height or weight on file to calculate BMI. Based upon direct observation of the patient, evaluation of cognition reveals recent and remote memory intact. Patient's complete Health Risk Assessment and screening values have been reviewed and are found in Flowsheets. The following problems were reviewed today and where indicated follow up appointments were made and/or referrals ordered. Positive Risk Factor Screenings with Interventions:     Fall Risk:  2 or more falls in past year?: (!) yes(tripped on sidewalk and fell at home)  Fall with injury in past year?: no  Fall Risk Interventions:    · Home safety tips provided    Cognitive: Words recalled: 2 Words Recalled  Clock Drawing Test (CDT) Score: (alexis)  Total Score Interpretation: Positive Mini-Cog  Cognitive Impairment Interventions:  · Patient declines any further evaluation/treatment for cognitive impairment    General Health:  General  In the past 7 days, have you experienced any of the following?  New or Increased Pain, New or Increased Fatigue, Loneliness, Social Isolation, Stress or Anger?: (!) Loneliness, Social Isolation(d/t COVID)  Do you get the social and emotional support that you need?: Yes  Do you have a Living Will?: (!) medicare awv and health maintenance. Diagnoses and all orders for this visit:    ACP (advance care planning)  -     [address] [facility]  [facility] [[id number]]    Routine general medical examination at a health care facility  -     [address] [place] [address]              Talha Mosher is a 48 y.o. female being evaluated by a Virtual Visit (phone) encounter to address concerns as mentioned above. A caregiver was present when appropriate. Due to this being a TeleHealth encounter (During [facility] public health emergency), evaluation of the following organ systems was limited: Vitals/Constitutional/EENT/Resp/CV/GI//MS/Neuro/Skin/Heme-Lymph-Imm. Pursuant to the emergency declaration under the [address], [address] [place] [address] [facility] authority and the [employer] Act, this Virtual Visit was conducted with patient's (and/or legal guardian's) consent, to reduce the patient's risk of exposure to COVID-19 and provide necessary medical care. The patient (and/or legal guardian) has also been advised to contact this office for worsening conditions or problems, and seek emergency medical treatment and/or call 911 if deemed necessary. Patient identification was verified at the start of the visit: Yes    Services were provided through phone to substitute for in-person clinic visit. Patient and provider were located at their individual homes. --RANDELL Vasques - CNP on 9/18/2020 at 1:25 PM    An electronic signature was used to authenticate this note. Advance Care Planning   Advanced Care Planning: Discussed the patients choices for care and treatment in case of a health event that adversely affects decision-making abilities. Also discussed the patients long-term treatment options.  Reviewed with the patient the [facility] Will Declaration forms  Reviewed the process of designating a

## 2020-09-18 NOTE — PATIENT INSTRUCTIONS
Advance Directives: Care Instructions  Overview  An advance directive is a legal way to state your wishes at the end of your life. It tells your family and your doctor what to do if you can't say what you want. There are two main types of advance directives. You can change them any time your wishes change. Living will. This form tells your family and your doctor your wishes about life support and other treatment. The form is also called a declaration. Medical power of . This form lets you name a person to make treatment decisions for you when you can't speak for yourself. This person is called a health care agent (health care proxy, health care surrogate). The form is also called a durable power of  for health care. If you do not have an advance directive, decisions about your medical care may be made by a family member, or by a doctor or a  who doesn't know you. It may help to think of an advance directive as a gift to the people who care for you. If you have one, they won't have to make tough decisions by themselves. Follow-up care is a key part of your treatment and safety. Be sure to make and go to all appointments, and call your doctor if you are having problems. It's also a good idea to know your test results and keep a list of the medicines you take. What should you include in an advance directive? Many states have a unique advance directive form. (It may ask you to address specific issues.) Or you might use a universal form that's approved by many states. If your form doesn't tell you what to address, it may be hard to know what to include in your advance directive. Use the questions below to help you get started. · Who do you want to make decisions about your medical care if you are not able to? · What life-support measures do you want if you have a serious illness that gets worse over time or can't be cured? · What are you most afraid of that might happen? (Maybe you're afraid of having pain, losing your independence, or being kept alive by machines.)  · Where would you prefer to die? (Your home? A hospital? A nursing home?)  · Do you want to donate your organs when you die? · Do you want certain Taoist practices performed before you die? When should you call for help? Be sure to contact your doctor if you have any questions. Where can you learn more? Go to https://chpepiceweb.[email or web address] org and sign in to your First To File account. Enter R264 in the Start[employer]ull box to learn more about \"Advance Directives: Care Instructions. \"     If you do not have an account, please click on the \"Sign Up Now\" link. Current as of: December 9, 2019               Content Version: 12.5  © 2290-4506 Healthwise, Incorporated. Care instructions adapted under license by [facility] ([facility]). If you have questions about a medical condition or this instruction, always ask your healthcare professional. [place] [address] any warranty or liability for your use of this information. Learning About Medical Power of   What is a medical power of ? A medical power of , also called a durable power of  for health care, is one type of the legal forms called advance directives. It lets you name the person you want to make treatment decisions for you if you can't speak or decide for yourself. The person you choose is called your health care agent. This person is also called a health care proxy or health care surrogate. A medical power of  may be called something else in your state. How do you choose a health care agent? Choose your health care agent carefully. This person may or may not be a family member. Talk to the person before you make your final decision. Make sure he or she is comfortable with this responsibility. It's a good idea to choose someone who:  · Is at least 25years old.   · Knows you well and understands what makes life meaningful for you. · Understands your Anglican and moral values. · Will do what you want, not what he or she wants. · Will be able to make difficult choices at a stressful time. · Will be able to refuse or stop treatment, if that is what you would want, even if you could die. · Will be firm and confident with health professionals if needed. · Will ask questions to get needed information. · Lives near you or agrees to travel to you if needed. Your family may help you make medical decisions while you can still be part of that process. But it's important to choose one person to be your health care agent in case you aren't able to make decisions for yourself. If you don't fill out the legal form and name a health care agent, the decisions your family can make may be limited. A health care agent may be called something else in your state. Who will make decisions for you if you don't have a health care agent? If you don't have a health care agent or a living will, you may not get the care you want. Decisions may be made by family members who disagree about your medical care. Or decisions may be made by a medical professional who doesn't know you well. In some cases, a  makes the decisions. When you name a health care agent, it is very clear who has the power to make health decisions for you. How do you name a health care agent? You name your health care agent on a legal form. This form is usually called a medical power of . Ask your hospital, state bar association, or office on aging where to find these forms. You must sign the form to make it legal. Some states require you to get the form notarized. This means that a person called a  watches you sign the form and then he or she signs the form. Some states also require that two or more witnesses sign the form. Be sure to tell your family members and doctors who your health care agent is.   Where can you learn more? Go to https://chpepiceweb.[employer]berry. org and sign in to your Cohuman account. Enter [id number] in the [facility] box to learn more about \"Learning About [address]. \"     If you do not have an account, please click on the \"Sign Up Now\" link. Current as of: December 9, 2019               Content Version: 12.5  © 4117-6165 [employer]. Care instructions adapted under license by [facility] ([facility]). If you have questions about a medical condition or this instruction, always ask your healthcare professional. [address] any warranty or liability for your use of this information. Learning About Living Perroy  What is a living will? A living will, also called a declaration, is a legal form. It tells your family and your doctor your wishes when you can't speak for yourself. It's used by the health professionals who will treat you as you near the end of your life or if you get seriously hurt or ill. If you put your wishes in writing, your loved ones and others will know what kind of care you want. They won't need to guess. This can ease your mind and be helpful to others. And you can change or cancel your living will at any time. A living will is not the same as an estate or property will. An estate will explains what you want to happen with your money and property after you die. How do you use it? A living will is used to describe the kinds of treatment or life support you want as you near the end of your life or if you get seriously hurt or ill. Keep these facts in mind about living tran. · Your living will is used only if you can't speak or make decisions for yourself. Most often, one or more doctors must certify that you can't speak or decide for yourself before your living will takes effect. · If you get better and can speak for yourself again, you can accept or refuse any treatment.  It doesn't matter what you said in your living will. · Some states may limit your right to refuse treatment in certain cases. For example, you may need to clearly state in your living will that you don't want artificial hydration and nutrition, such as being fed through a tube. Is a living will a legal document? A living will is a legal document. Each state has its own laws about living tran. And a living will may be called something else in your state. Here are some things to know about living tran. · You don't need an  to complete a living will. But legal advice can be helpful if your state's laws are unclear. It can also help if your health history is complicated or your family can't agree on what should be in your living will. · You can change your living will at any time. Some people find that their wishes about end-of-life care change as their health changes. If you make big changes to your living will, complete a new form. · If you move to another state, make sure that your living will is legal in the state where you now live. In most cases, doctors will respect your wishes even if you have a form from a different state. · You might use a universal form that has been approved by many states. This kind of form can sometimes be filled out and stored online. Your digital copy will then be available wherever you have a connection to the internet. The doctors and nurses who need to treat you can find it right away. · Your state may offer an online registry. This is another place where you can store your living will online. · It's a good idea to get your living will notarized. This means using a person called a  to watch two people sign, or witness, your living will. What should you know when you create a living will? Here are some questions to ask yourself as you make your living will:  · Do you know enough about life support methods that might be used?  If not, talk to your doctor so you know what might be done if you can't breathe on your own, your heart stops, or you can't swallow. · What things would you still want to be able to do after you receive life-support methods? Would you want to be able to walk? To speak? To eat on your own? To live without the help of machines? · Do you want certain Rastafarian practices performed if you become very ill? · If you have a choice, where do you want to be cared for? In your home? At a hospital or nursing home? · If you have a choice at the end of your life, where would you prefer to die? At home? In a hospital or nursing home? Somewhere else? · Would you prefer to be buried or cremated? · Do you want your organs to be donated after you die? What should you do with your living will? · Make sure that your family members and your health care agent have copies of your living will (also called a declaration). · Give your doctor a copy of your living will. Ask him or her to keep it as part of your medical record. If you have more than one doctor, make sure that each one has a copy. · Put a copy of your living will where it can be easily found. For example, some people may put a copy on their refrigerator door. If you are using a digital copy, be sure your doctor, family members, and health care agent know how to find and access it. Where can you learn more? Go to https://chpepiceweb.healthSmallaa. org and sign in to your Fleck account. Enter G743 in the [facility] box to learn more about \"Learning About Living [place]. \"     If you do not have an account, please click on the \"Sign Up Now\" link. Current as of: December 9, 2019               Content Version: 12.5  © 7228-8308 Healthwise, Incorporated. Care instructions adapted under license by [facility] ([facility]).  If you have questions about a medical condition or this instruction, always ask your healthcare professional. [address] any warranty or liability for your use of this information. Personalized Preventive Plan for Magdy Pleitez - 9/18/2020  Medicare offers a range of preventive health benefits. Some of the tests and screenings are paid in full while other may be subject to a deductible, co-insurance, and/or copay. Some of these benefits include a comprehensive review of your medical history including lifestyle, illnesses that may run in your family, and various assessments and screenings as appropriate. After reviewing your medical record and screening and assessments performed today your provider may have ordered immunizations, labs, imaging, and/or referrals for you. A list of these orders (if applicable) as well as your Preventive Care list are included within your After Visit Summary for your review. Other Preventive Recommendations:    · A preventive eye exam performed by an eye specialist is recommended every 1-2 years to screen for glaucoma; cataracts, macular degeneration, and other eye disorders. · A preventive dental visit is recommended every 6 months. · Try to get at least 150 minutes of exercise per week or 10,000 steps per day on a pedometer . · Order or download the FREE \"Exercise & Physical Activity: Your Everyday Guide\" from [employer]. Call 1-335.166.6529 or search [employer] online. · You need 5618-5934 mg of calcium and 5719-0871 IU of vitamin D per day. It is possible to meet your calcium requirement with diet alone, but a vitamin D supplement is usually necessary to meet this goal.  · When exposed to the sun, use a sunscreen that protects against both UVA and UVB radiation with an SPF of 30 or greater. Reapply every 2 to 3 hours or after sweating, drying off with a towel, or swimming. · Always wear a seat belt when traveling in a car. Always wear a helmet when riding a bicycle or motorcycle.       Patient was put on a wait list and will be contacted to schedule their next follow up appointment once the schedule is available. If the patient is in need of an appointment before their next visit please call the office at [phone]. After Visit Summary  mailed to patient.     SL

## 2020-12-10 ENCOUNTER — TELEPHONE (OUTPATIENT)
Dept: INTERNAL MEDICINE | Age: 55
End: 2020-12-10

## 2020-12-10 NOTE — LETTER
[address]  [address] [address]  Phone: [phone]  Fax: [phone]    Mary Ann Barajas MD        December 10, 2020    [address] [place], [place] [address]      Dear Marcel Smith:    This letter is a reminder that you may have diagnostic testing that has not been completed. It is important to your well-being that these test(s) are performed. Please find the outstanding test(s) attached. If you could please have these completed before your next appointment. You can have the test completed at any [facility] facility or Lab. Please see the order for scheduling instructions. Otherwise can be done at any [facility]. Please call our office at Dept: [phone] for additional information on the outstanding tests or let us know if they have been completed so we may update your chart. If you have any questions or concerns, please don't hesitate to call.     Sincerely,        Mary Ann Barajas MD

## 2021-03-19 ENCOUNTER — HOSPITAL ENCOUNTER (OUTPATIENT)
Age: 56
Setting detail: SPECIMEN
Discharge: HOME | End: 2021-03-19
Payer: COMMERCIAL

## 2021-03-19 DIAGNOSIS — R79.89 LOW VITAMIN D LEVEL: ICD-10-CM

## 2021-03-19 DIAGNOSIS — E53.8 LOW FOLIC ACID: ICD-10-CM

## 2021-03-19 DIAGNOSIS — E78.00 PURE HYPERCHOLESTEROLEMIA: ICD-10-CM

## 2021-03-19 LAB
ALBUMIN SERPL-MCNC: 4.3 G/DL (ref 3.5–5.2)
ALBUMIN/GLOBULIN RATIO: 1.2 (ref 1–2.5)
ALP BLD-CCNC: 77 U/L (ref 35–104)
ALT SERPL-CCNC: 10 U/L (ref 5–33)
ANION GAP SERPL CALCULATED.3IONS-SCNC: 10 MMOL/L (ref 9–17)
AST SERPL-CCNC: 16 U/L
BILIRUB SERPL-MCNC: 0.55 MG/DL (ref 0.3–1.2)
BUN BLDV-MCNC: 9 MG/DL (ref 6–20)
BUN/CREAT BLD: NORMAL (ref 9–20)
CALCIUM SERPL-MCNC: 9.3 MG/DL (ref 8.6–10.4)
CHLORIDE BLD-SCNC: 103 MMOL/L (ref 98–107)
CHOLESTEROL/HDL RATIO: 2.2
CHOLESTEROL: 184 MG/DL
CO2: 26 MMOL/L (ref 20–31)
CREAT SERPL-MCNC: 0.81 MG/DL (ref 0.5–0.9)
FOLATE: >20 NG/ML
GFR AFRICAN AMERICAN: >60 ML/MIN
GFR NON-AFRICAN AMERICAN: >60 ML/MIN
GFR SERPL CREATININE-BSD FRML MDRD: NORMAL ML/MIN/{1.73_M2}
GFR SERPL CREATININE-BSD FRML MDRD: NORMAL ML/MIN/{1.73_M2}
GLUCOSE BLD-MCNC: 95 MG/DL (ref 70–99)
HCT VFR BLD CALC: 38.7 % (ref 36.3–47.1)
HDLC SERPL-MCNC: 84 MG/DL
HEMOGLOBIN: 12 G/DL (ref 11.9–15.1)
LDL CHOLESTEROL: 88 MG/DL (ref 0–130)
MCH RBC QN AUTO: 28.9 PG (ref 25.2–33.5)
MCHC RBC AUTO-ENTMCNC: 31 G/DL (ref 28.4–34.8)
MCV RBC AUTO: 93.3 FL (ref 82.6–102.9)
NRBC AUTOMATED: 0 PER 100 WBC
PDW BLD-RTO: 13.6 % (ref 11.8–14.4)
PLATELET # BLD: 255 K/UL (ref 138–453)
PMV BLD AUTO: 11.4 FL (ref 8.1–13.5)
POTASSIUM SERPL-SCNC: 4 MMOL/L (ref 3.7–5.3)
RBC # BLD: 4.15 M/UL (ref 3.95–5.11)
SODIUM BLD-SCNC: 139 MMOL/L (ref 135–144)
TOTAL PROTEIN: 7.8 G/DL (ref 6.4–8.3)
TRIGL SERPL-MCNC: 62 MG/DL
TSH SERPL DL<=0.05 MIU/L-ACNC: 1.61 MIU/L (ref 0.3–5)
VITAMIN B-12: 640 PG/ML (ref 232–1245)
VITAMIN D 25-HYDROXY: 34.2 NG/ML (ref 30–100)
VLDLC SERPL CALC-MCNC: NORMAL MG/DL (ref 1–30)
WBC # BLD: 7.4 K/UL (ref 3.5–11.3)

## 2021-04-03 ENCOUNTER — HOSPITAL ENCOUNTER (EMERGENCY)
Age: 56
Discharge: HOME | End: 2021-04-04
Attending: EMERGENCY MEDICINE
Payer: COMMERCIAL

## 2021-04-03 VITALS
TEMPERATURE: 98.1 F | WEIGHT: 225 LBS | BODY MASS INDEX: 34.1 KG/M2 | SYSTOLIC BLOOD PRESSURE: 125 MMHG | RESPIRATION RATE: 18 BRPM | HEIGHT: 68 IN | DIASTOLIC BLOOD PRESSURE: 67 MMHG | OXYGEN SATURATION: 97 % | HEART RATE: 70 BPM

## 2021-04-03 DIAGNOSIS — N83.202 CYST OF LEFT OVARY: Primary | ICD-10-CM

## 2021-04-03 DIAGNOSIS — K31.84 GASTROPARESIS: ICD-10-CM

## 2021-04-03 LAB
ABSOLUTE EOS #: 0.47 K/UL (ref 0–0.44)
ABSOLUTE IMMATURE GRANULOCYTE: 0.03 K/UL (ref 0–0.3)
ABSOLUTE LYMPH #: 2.37 K/UL (ref 1.1–3.7)
ABSOLUTE MONO #: 0.51 K/UL (ref 0.1–1.2)
BASOPHILS # BLD: 1 % (ref 0–2)
BASOPHILS ABSOLUTE: 0.06 K/UL (ref 0–0.2)
DIFFERENTIAL TYPE: ABNORMAL
EOSINOPHILS RELATIVE PERCENT: 6 % (ref 1–4)
HCT VFR BLD CALC: 35.4 % (ref 36.3–47.1)
HEMOGLOBIN: 11 G/DL (ref 11.9–15.1)
IMMATURE GRANULOCYTES: 0 %
LYMPHOCYTES # BLD: 28 % (ref 24–43)
MCH RBC QN AUTO: 29.3 PG (ref 25.2–33.5)
MCHC RBC AUTO-ENTMCNC: 31.1 G/DL (ref 28.4–34.8)
MCV RBC AUTO: 94.4 FL (ref 82.6–102.9)
MONOCYTES # BLD: 6 % (ref 3–12)
NRBC AUTOMATED: 0 PER 100 WBC
PDW BLD-RTO: 13.1 % (ref 11.8–14.4)
PLATELET # BLD: 229 K/UL (ref 138–453)
PLATELET ESTIMATE: ABNORMAL
PMV BLD AUTO: 10.7 FL (ref 8.1–13.5)
RBC # BLD: 3.75 M/UL (ref 3.95–5.11)
RBC # BLD: ABNORMAL 10*6/UL
SEG NEUTROPHILS: 59 % (ref 36–65)
SEGMENTED NEUTROPHILS ABSOLUTE COUNT: 4.97 K/UL (ref 1.5–8.1)
WBC # BLD: 8.4 K/UL (ref 3.5–11.3)
WBC # BLD: ABNORMAL 10*3/UL

## 2021-04-03 PROCEDURE — 2580000003 HC RX 258: Performed by: EMERGENCY MEDICINE

## 2021-04-03 PROCEDURE — 83690 ASSAY OF LIPASE: CPT

## 2021-04-03 PROCEDURE — 96361 HYDRATE IV INFUSION ADD-ON: CPT

## 2021-04-03 PROCEDURE — 99283 EMERGENCY DEPT VISIT LOW MDM: CPT

## 2021-04-03 PROCEDURE — 96375 TX/PRO/DX INJ NEW DRUG ADDON: CPT

## 2021-04-03 PROCEDURE — 80053 COMPREHEN METABOLIC PANEL: CPT

## 2021-04-03 PROCEDURE — 85025 COMPLETE CBC W/AUTO DIFF WBC: CPT

## 2021-04-03 PROCEDURE — 2500000003 HC RX 250 WO HCPCS: Performed by: EMERGENCY MEDICINE

## 2021-04-03 PROCEDURE — 96374 THER/PROPH/DIAG INJ IV PUSH: CPT

## 2021-04-03 PROCEDURE — 6360000002 HC RX W HCPCS: Performed by: EMERGENCY MEDICINE

## 2021-04-03 PROCEDURE — 6370000000 HC RX 637 (ALT 250 FOR IP): Performed by: EMERGENCY MEDICINE

## 2021-04-03 RX ORDER — IBUPROFEN 200 MG
200 TABLET ORAL EVERY 6 HOURS PRN
COMMUNITY

## 2021-04-03 RX ORDER — 0.9 % SODIUM CHLORIDE 0.9 %
1000 INTRAVENOUS SOLUTION INTRAVENOUS ONCE
Status: COMPLETED | OUTPATIENT
Start: 2021-04-03 | End: 2021-04-04

## 2021-04-03 RX ORDER — MAGNESIUM HYDROXIDE/ALUMINUM HYDROXICE/SIMETHICONE 120; 1200; 1200 MG/30ML; MG/30ML; MG/30ML
30 SUSPENSION ORAL ONCE
Status: COMPLETED | OUTPATIENT
Start: 2021-04-03 | End: 2021-04-03

## 2021-04-03 RX ORDER — ONDANSETRON 2 MG/ML
4 INJECTION INTRAMUSCULAR; INTRAVENOUS ONCE
Status: COMPLETED | OUTPATIENT
Start: 2021-04-03 | End: 2021-04-03

## 2021-04-03 RX ADMIN — ONDANSETRON 4 MG: 2 INJECTION INTRAMUSCULAR; INTRAVENOUS at 23:52

## 2021-04-03 RX ADMIN — ALUMINUM HYDROXIDE, MAGNESIUM HYDROXIDE, AND SIMETHICONE 30 ML: 200; 200; 20 SUSPENSION ORAL at 23:52

## 2021-04-03 RX ADMIN — SODIUM CHLORIDE 1000 ML: 9 INJECTION, SOLUTION INTRAVENOUS at 23:52

## 2021-04-03 RX ADMIN — FAMOTIDINE 20 MG: 10 INJECTION, SOLUTION INTRAVENOUS at 23:52

## 2021-04-04 ENCOUNTER — APPOINTMENT (OUTPATIENT)
Dept: CT IMAGING | Age: 56
End: 2021-04-04
Payer: COMMERCIAL

## 2021-04-04 LAB
ALBUMIN SERPL-MCNC: 3.9 G/DL (ref 3.5–5.2)
ALBUMIN/GLOBULIN RATIO: ABNORMAL (ref 1–2.5)
ALP BLD-CCNC: 86 U/L (ref 35–104)
ALT SERPL-CCNC: 6 U/L (ref 5–33)
ANION GAP SERPL CALCULATED.3IONS-SCNC: 10 MMOL/L (ref 9–17)
AST SERPL-CCNC: 17 U/L
BILIRUB SERPL-MCNC: 0.19 MG/DL (ref 0.3–1.2)
BUN BLDV-MCNC: 11 MG/DL (ref 6–20)
BUN/CREAT BLD: 13 (ref 9–20)
CALCIUM SERPL-MCNC: 8.8 MG/DL (ref 8.6–10.4)
CHLORIDE BLD-SCNC: 104 MMOL/L (ref 98–107)
CO2: 26 MMOL/L (ref 20–31)
CREAT SERPL-MCNC: 0.82 MG/DL (ref 0.5–0.9)
GFR AFRICAN AMERICAN: >60 ML/MIN
GFR NON-AFRICAN AMERICAN: >60 ML/MIN
GFR SERPL CREATININE-BSD FRML MDRD: ABNORMAL ML/MIN/{1.73_M2}
GFR SERPL CREATININE-BSD FRML MDRD: ABNORMAL ML/MIN/{1.73_M2}
GLUCOSE BLD-MCNC: 107 MG/DL (ref 70–99)
LIPASE: 24 U/L (ref 13–60)
POTASSIUM SERPL-SCNC: 4 MMOL/L (ref 3.7–5.3)
SODIUM BLD-SCNC: 140 MMOL/L (ref 135–144)
TOTAL PROTEIN: 6.8 G/DL (ref 6.4–8.3)

## 2021-04-04 PROCEDURE — 2580000003 HC RX 258: Performed by: EMERGENCY MEDICINE

## 2021-04-04 PROCEDURE — 6360000004 HC RX CONTRAST MEDICATION: Performed by: EMERGENCY MEDICINE

## 2021-04-04 PROCEDURE — 74177 CT ABD & PELVIS W/CONTRAST: CPT

## 2021-04-04 RX ORDER — 0.9 % SODIUM CHLORIDE 0.9 %
80 INTRAVENOUS SOLUTION INTRAVENOUS ONCE
Status: COMPLETED | OUTPATIENT
Start: 2021-04-04 | End: 2021-04-04

## 2021-04-04 RX ORDER — SODIUM CHLORIDE 0.9 % (FLUSH) 0.9 %
10 SYRINGE (ML) INJECTION ONCE
Status: COMPLETED | OUTPATIENT
Start: 2021-04-04 | End: 2021-04-04

## 2021-04-04 RX ADMIN — IOPAMIDOL 75 ML: 755 INJECTION, SOLUTION INTRAVENOUS at 00:17

## 2021-04-04 RX ADMIN — SODIUM CHLORIDE, PRESERVATIVE FREE 10 ML: 5 INJECTION INTRAVENOUS at 00:17

## 2021-04-04 RX ADMIN — SODIUM CHLORIDE 80 ML: 0.9 INJECTION, SOLUTION INTRAVENOUS at 00:18

## 2021-04-04 NOTE — ED PROVIDER NOTES
EMERGENCY DEPARTMENT ENCOUNTER    Pt Name: Sandy Tellez  MRN: [id number]  [place] 1965  Date of evaluation: 4/3/21  CHIEF COMPLAINT       Chief Complaint   Patient presents with    Abdominal Pain     this morning; generalized    Exposure to STD     concern related to gonorrhea     HISTORY OF PRESENT ILLNESS   Patient is a 64-year-old female with multiple committees listed below who presents the ED complaining of epigastric pain. Pain started early this morning \"on empty stomach \". She subsequently was able to eat lunch which was a turkey sandwich with [employer]. She also was able to eat dinner at [employer] which was a \"steak, loaded baked potato and corn on the cob \". She also adds that she had her she is chocolate bar with almonds and [place] cookies at lunch as well. She does have mild nausea. No vomiting. She had normal bowel movement today. She is monogamous with one partner. No vaginal discharge, vaginal bleeding. She has low concern for STD however thought across her mind when she developed her pain today. No fever, cough, chest pain, changes in urine. REVIEW OF SYSTEMS     Review of Systems   All other systems reviewed and are negative.     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    KLAUDIA (stress urinary incontinence, female) 8/19/2014     SURGICAL HISTORY       Past Surgical History:   Procedure Laterality Date    ANUS SURGERY      cyst I and D    OTHER SURGICAL HISTORY      cervical biopsy    TONSILLECTOMY       CURRENT MEDICATIONS       Previous Medications    ESCITALOPRAM (LEXAPRO) 20 MG TABLET    TAKE 1 TAB BY MOUTH ONCE A DAY    FOLIC ACID (FOLVITE) 1 MG TABLET    Take 1 tablet by mouth daily    IBUPROFEN baseline. Psychiatric:         Mood and Affect: Mood normal.         Behavior: Behavior normal.      Comments:     Limited exam secondary to Covid-19 pandemic. MEDICAL DECISION MAKING:   The patient is hemodynamically stable, afebrile, nontoxic-appearing. Physical exam notable for mild epigastric tenderness. No rebound, no guarding. Based on history and exam differential is GERD, diverticulosis, constipation. ED plan for basic labs, Zofran, Pepcid, CT abdomen and pelvis, reassess. DIAGNOSTIC RESULTS   EKG:All EKG's are interpreted by the Emergency Department Physician who either signs or Co-signs this chart in the absence of a cardiologist.        RADIOLOGY:All plain film, CT, MRI, and formal ultrasound images (except ED bedside ultrasound) are read by the radiologist, see reports below, unless otherwisenoted in MDM or here. CT ABDOMEN PELVIS W IV CONTRAST Additional Contrast? None   Final Result   Moderate distention of the stomach which could be due to mild gastroparesis   or less likely early gastric outlet obstruction. Recommend clinical   follow-up. Contracted gallbladder with no gallstones. Suggest ultrasound correlation,   if indicated      Scattered diverticula along the sigmoid colon with no pericolonic   inflammation. Probable 3.6 cm ovarian cyst on the right. Recommend nonemergent ultrasound   follow-up to assure resolution. LABS: All lab results were reviewed by myself, and all abnormals are listed below.   Labs Reviewed   CBC WITH AUTO DIFFERENTIAL - Abnormal; Notable for the following components:       Result Value    RBC 3.75 (*)     Hemoglobin 11.0 (*)     Hematocrit 35.4 (*)     Eosinophils % 6 (*)     Absolute Eos # 0.47 (*)     All other components within normal limits   COMPREHENSIVE METABOLIC PANEL W/ REFLEX TO MG FOR LOW K - Abnormal; Notable for the following components:    Glucose 107 (*)     Total Bilirubin 0.19 (*)     All other components

## 2021-04-06 PROCEDURE — 99283 EMERGENCY DEPT VISIT LOW MDM: CPT

## 2021-04-07 ENCOUNTER — HOSPITAL ENCOUNTER (EMERGENCY)
Age: 56
Discharge: HOME | End: 2021-04-07
Attending: EMERGENCY MEDICINE
Payer: COMMERCIAL

## 2021-04-07 VITALS
TEMPERATURE: 97.2 F | SYSTOLIC BLOOD PRESSURE: 113 MMHG | OXYGEN SATURATION: 98 % | RESPIRATION RATE: 17 BRPM | HEART RATE: 68 BPM | DIASTOLIC BLOOD PRESSURE: 42 MMHG

## 2021-04-07 DIAGNOSIS — L50.9 URTICARIA: Primary | ICD-10-CM

## 2021-04-07 PROCEDURE — 6370000000 HC RX 637 (ALT 250 FOR IP): Performed by: EMERGENCY MEDICINE

## 2021-04-07 RX ORDER — PREDNISONE 20 MG/1
50 TABLET ORAL ONCE
Status: COMPLETED | OUTPATIENT
Start: 2021-04-07 | End: 2021-04-07

## 2021-04-07 RX ORDER — PREDNISONE 50 MG/1
50 TABLET ORAL DAILY
Qty: 5 TABLET | Refills: 0 | Status: SHIPPED | OUTPATIENT
Start: 2021-04-07 | End: 2021-04-12

## 2021-04-07 RX ADMIN — PREDNISONE 50 MG: 20 TABLET ORAL at 01:55

## 2021-04-07 NOTE — ED TRIAGE NOTES
Here tonight with rash on chest, neck, arms, and thighs. States was at [facility] ED on Sat. No new foods, hygiene products, nor clothing detergent.

## 2021-04-07 NOTE — ED PROVIDER NOTES
EMERGENCY DEPARTMENT ENCOUNTER    Pt Name: Kemi Cheung  MRN: [id number]  [place] 1965  Date of evaluation: 4/7/21  CHIEF COMPLAINT       Chief Complaint   Patient presents with    Rash     pt states that she started having a rash on Sunday or Monday, pt states the rash is to her neck and chest      HISTORY OF PRESENT ILLNESS   Patient is a 64-year-old female who presents the ED for evaluation of itchy rash. Rash started 2 days ago on chest and spread to forearms bilaterally. No airway involvement. No shortness of breath, cough, stridor, wheezing. No exposures to new chemicals, detergents or soaps. REVIEW OF SYSTEMS     Review of Systems   All other systems reviewed and are negative.     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    KLAUDIA (stress urinary incontinence, female) 8/19/2014     SURGICAL HISTORY       Past Surgical History:   Procedure Laterality Date    ANUS SURGERY      cyst I and D    OTHER SURGICAL HISTORY      cervical biopsy    TONSILLECTOMY       CURRENT MEDICATIONS       Previous Medications    ESCITALOPRAM (LEXAPRO) 20 MG TABLET    TAKE 1 TAB BY MOUTH ONCE A DAY    FOLIC ACID (FOLVITE) 1 MG TABLET    Take 1 tablet by mouth daily    IBUPROFEN (ADVIL;MOTRIN) 200 MG TABLET    Take 200 mg by mouth every 6 hours as needed for Pain    LORATADINE-PSEUDOEPHEDRINE (CLARITIN-D 12 HOUR) 5-120 MG PER EXTENDED RELEASE TABLET    Take 1 tablet by mouth 2 times daily    MULTIPLE VITAMIN (MULTIVITAMIN ADULT PO)    Take by mouth    PRAVASTATIN (PRAVACHOL) 40 MG TABLET    Take 1 tablet by mouth daily    TRAZODONE (DESYREL) 100 MG TABLET    Take 1 tablet by mouth nightly    VITAMIN B-12 (CYANOCOBALAMIN) 1000 MCG TABLET    Take 1 tablet by mouth daily

## 2021-09-15 ENCOUNTER — APPOINTMENT (OUTPATIENT)
Dept: GENERAL RADIOLOGY | Age: 56
End: 2021-09-15
Payer: COMMERCIAL

## 2021-09-15 ENCOUNTER — HOSPITAL ENCOUNTER (EMERGENCY)
Age: 56
Discharge: HOME | End: 2021-09-16
Attending: EMERGENCY MEDICINE
Payer: COMMERCIAL

## 2021-09-15 VITALS
TEMPERATURE: 97.8 F | OXYGEN SATURATION: 98 % | HEIGHT: 68 IN | SYSTOLIC BLOOD PRESSURE: 115 MMHG | HEART RATE: 85 BPM | WEIGHT: 215 LBS | RESPIRATION RATE: 18 BRPM | DIASTOLIC BLOOD PRESSURE: 71 MMHG | BODY MASS INDEX: 32.58 KG/M2

## 2021-09-15 DIAGNOSIS — M25.571 ACUTE RIGHT ANKLE PAIN: Primary | ICD-10-CM
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
## 2021-09-15 PROCEDURE — 6370000000 HC RX 637 (ALT 250 FOR IP): Performed by: EMERGENCY MEDICINE

## 2021-09-15 PROCEDURE — 99283 EMERGENCY DEPT VISIT LOW MDM: CPT

## 2021-09-15 PROCEDURE — 73610 X-RAY EXAM OF ANKLE: CPT

## 2021-09-15 RX ORDER — ACETAMINOPHEN 500 MG
1000 TABLET ORAL ONCE
Status: COMPLETED | OUTPATIENT
Start: 2021-09-15 | End: 2021-09-15

## 2021-09-15 RX ADMIN — ACETAMINOPHEN 1000 MG: 500 TABLET, FILM COATED ORAL at 23:12

## 2021-09-15 ASSESSMENT — PAIN DESCRIPTION - DESCRIPTORS: DESCRIPTORS: DISCOMFORT

## 2021-09-15 ASSESSMENT — PAIN SCALES - GENERAL
PAINLEVEL_OUTOF10: 8
PAINLEVEL_OUTOF10: 7

## 2021-09-15 ASSESSMENT — PAIN DESCRIPTION - LOCATION: LOCATION: GENERALIZED

## 2021-09-15 ASSESSMENT — PAIN DESCRIPTION - FREQUENCY: FREQUENCY: CONTINUOUS

## 2021-09-16 ASSESSMENT — ENCOUNTER SYMPTOMS
BACK PAIN: 0
SHORTNESS OF BREATH: 0
COLOR CHANGE: 0
ABDOMINAL PAIN: 0
EYE PAIN: 0
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
## 2021-09-17 ENCOUNTER — TELEPHONE (OUTPATIENT)
Dept: INTERNAL MEDICINE | Age: 56
End: 2021-09-17

## 2021-09-17 NOTE — TELEPHONE ENCOUNTER
----- Message from [place] sent at 9/17/2021  1:52 PM EDT -----  Subject: Message to Provider    QUESTIONS  Information for Provider? Patient is calling says she has strep throat   symptoms inflamed throat temp 98.5. Drainage down her throat possible   sinus infection as well. Would like for DR Rochelle Ruff to call in an   antibiotic and something for congestion sent to [address] [place] [address] [phone] please reach out to patient at [phone] asap please leave   detailed message if no answer. ---------------------------------------------------------------------------  --------------  Ivanna MORRISSEY  What is the best way for the office to contact you? OK to leave message on   voicemail  Preferred Call Back Phone Number? [phone]  ---------------------------------------------------------------------------  --------------  SCRIPT ANSWERS  Relationship to Patient?  Self

## 2021-09-18 ENCOUNTER — HOSPITAL ENCOUNTER (EMERGENCY)
Age: 56
Discharge: HOME | End: 2021-09-18
Attending: EMERGENCY MEDICINE
Payer: COMMERCIAL

## 2021-09-18 VITALS
DIASTOLIC BLOOD PRESSURE: 73 MMHG | TEMPERATURE: 98.1 F | HEART RATE: 74 BPM | SYSTOLIC BLOOD PRESSURE: 121 MMHG | WEIGHT: 215 LBS | OXYGEN SATURATION: 99 % | RESPIRATION RATE: 16 BRPM | BODY MASS INDEX: 32.58 KG/M2 | HEIGHT: 68 IN

## 2021-09-18 DIAGNOSIS — Z20.822 SUSPECTED COVID-19 VIRUS INFECTION: Primary | ICD-10-CM

## 2021-09-18 LAB
DIRECT EXAM: NORMAL
Lab: NORMAL
SPECIMEN DESCRIPTION: NORMAL

## 2021-09-18 PROCEDURE — 87880 STREP A ASSAY W/OPTIC: CPT

## 2021-09-18 PROCEDURE — 99283 EMERGENCY DEPT VISIT LOW MDM: CPT

## 2021-09-18 ASSESSMENT — PAIN DESCRIPTION - LOCATION: LOCATION: THROAT

## 2021-09-18 ASSESSMENT — ENCOUNTER SYMPTOMS
SORE THROAT: 1
SHORTNESS OF BREATH: 0
BLOOD IN STOOL: 0
COLOR CHANGE: 0
CONSTIPATION: 0
DIARRHEA: 0
VOMITING: 0
TROUBLE SWALLOWING: 0
COUGH: 0
SINUS PRESSURE: 1
ABDOMINAL PAIN: 0
NAUSEA: 0
BACK PAIN: 0

## 2021-09-18 ASSESSMENT — PAIN SCALES - GENERAL: PAINLEVEL_OUTOF10: 7

## 2021-09-18 NOTE — ED PROVIDER NOTES
as mentioned above and in the HPI. PAST MEDICAL HISTORY     Past Medical History:   Diagnosis Date    Ankle sprain 2017    Anxiety     ASCUS of cervix with negative high risk HPV 9/15/2016    Depression     Hyperlipidemia     Obesity     Osteoarthritis     Pituitary tumor     prolacinoma- treated medically    KLAUDIA (stress urinary incontinence, female) 2014         SURGICAL HISTORY      has a past surgical history that includes Anus surgery; Tonsillectomy; and other surgical history. CURRENT MEDICATIONS       Discharge Medication List as of 2021  7:49 PM      CONTINUE these medications which have NOT CHANGED    Details   Multiple Vitamin (MULTIVITAMIN ADULT PO) Take by mouthHistorical Med      ibuprofen (ADVIL;MOTRIN) 200 MG tablet Take 200 mg by mouth every 6 hours as needed for PainHistorical Med      zinc 50 MG CAPS Take by mouthHistorical Med      vitamin D (CHOLECALCIFEROL) 50 MCG (2000 UT) TABS tablet Take 1 tablet by mouth daily, Disp-30 tablet,R-5Normal      vitamin B-12 (CYANOCOBALAMIN) 1000 MCG tablet Take 1 tablet by mouth daily, Disp-30 [id number]      folic acid (FOLVITE) 1 MG tablet Take 1 tablet by mouth daily, Disp-30 tablet,R-5Normal      traZODone (DESYREL) 100 MG tablet Take 1 tablet by mouth nightly, Disp-30 tablet,R-5Normal      pravastatin (PRAVACHOL) 40 MG tablet Take 1 tablet by mouth daily, Disp-30 tablet,R-5Normal      escitalopram (LEXAPRO) 20 MG tablet TAKE 1 TAB BY MOUTH ONCE A DAY, Disp-30 tablet,R-5Normal      loratadine-pseudoephedrine (CLARITIN-D 12 HOUR) 5-120 MG per extended release tablet Take 1 tablet by mouth 2 times daily, Disp-20 tablet, R-1Normal             ALLERGIES     is allergic to bactrim [sulfamethoxazole-trimethoprim]. FAMILY HISTORY     She indicated that her mother is . She indicated that her father is . She indicated that all of her three brothers are alive.  She indicated that her maternal grandmother Alexandria presents with sore throat and nasal congestion for the past 3 days. She is afebrile, nontoxic, normal vital signs. No acute distress. She is not having any respiratory symptoms and is 99% on room air. We will test for strep throat. However her throat exam is unremarkable. She has no swelling, exudates noted. I am more concerned for COVID-19, sinusitis, viral infection. I offered patient COVID-19 testing however she is refusing. She will not tell me why she is refusing and will not tell me why that she refused the vaccine. I did tell her that if the strep test is negative patient is considered to be assumed positive until proven otherwise. She acknowledges understanding this. DIAGNOSTIC RESULTS     EKG: All EKG's are interpreted by the Emergency Department Physician who either signs or Co-signs this chart in the absence of a cardiologist.        RADIOLOGY:   I directly visualized the following  images and reviewed the radiologist interpretations:  No orders to display           ED BEDSIDE ULTRASOUND:      LABS:  Labs Reviewed   STREP SCREEN GROUP A THROAT         EMERGENCY DEPARTMENT COURSE:   Vitals:    Vitals:    09/18/21 1847   BP: 121/73   Pulse: 74   Resp: 16   Temp: 98.1 °F (36.7 °C)   TempSrc: Oral   SpO2: 99%   Weight: 215 lb (97.5 kg)   Height: 5' 8\" (1.727 m)     Strep test is negative. I think patient most likely has a viral upper respiratory tract infection most likely COVID-19 with how prevalent it is in the area at this time. Patient was updated to consider herself positive. Advised her to self isolate as much as possible, return if any symptoms worsen. She is agreeable to plan will be discharged home today. CRITICALCARE:      CONSULTS:  None      PROCEDURES:      FINAL IMPRESSION      1.  Suspected COVID-19 virus infection            DISPOSITION/PLAN   DISPOSITION            PATIENT REFERRED TO:  MD Hardy [address] [address]  [phone]    Schedule an appointment as soon as possible for a visit       [facility] ED  [facility] [address] [phone]    As needed, If symptoms worsen      DISCHARGE MEDICATIONS:  Discharge Medication List as of 9/18/2021  7:49 PM          (Please note that portions ofthis note were completed with a voice recognition program.  Efforts were made to edit the dictations but occasionally words are mis-transcribed.)    Angel Mathew DO  Attending Emergency Physician          Angel Mathew DO  09/18/21 2041

## 2021-09-22 DIAGNOSIS — E53.8 LOW FOLIC ACID: ICD-10-CM

## 2021-09-23 RX ORDER — FOLIC ACID 1 MG/1
1 TABLET ORAL DAILY
Qty: 30 TABLET | Refills: 4 | Status: SHIPPED | OUTPATIENT
Start: 2021-09-23

## 2021-09-23 RX ORDER — CHOLECALCIFEROL (VITAMIN D3) 125 MCG
CAPSULE ORAL
Qty: 30 TABLET | Refills: 4 | Status: SHIPPED | OUTPATIENT
Start: 2021-09-23

## 2021-09-23 NOTE — TELEPHONE ENCOUNTER
E-scribe request for Folic Acid and Vit D3 . Please review and e-scribe if applicable. Next Visit Date:  No future appointments.   Health Maintenance   Topic Date Due    COVID-19 Vaccine (1) Never done    Diabetes screen  Never done    Shingles Vaccine (1 of 2) Never done    Breast cancer screen  07/24/2021    Flu vaccine (1) Never done    Annual Wellness Visit (AWV)  09/19/2021    Cervical cancer screen  08/30/2021    DTaP/Tdap/Td vaccine (1 - Tdap) 07/24/2024 (Originally 7/25/2014)    Lipid screen  03/19/2022    Colon cancer screen fecal DNA test (Cologuard)  09/20/2023    Hepatitis C screen  Completed    HIV screen  Completed    Hepatitis A vaccine  Aged Out    Hepatitis B vaccine  Aged Out    Hib vaccine  Aged Out    Meningococcal (ACWY) vaccine  Aged Out    Pneumococcal 0-64 years Vaccine  Aged Out               (applicable per patient's age: Cancer Screenings, Depression Screening, Fall Risk Screening, Immunizations)    LDL Cholesterol (mg/dL)   Date Value   03/19/2021 88     AST (U/L)   Date Value   04/03/2021 17     ALT (U/L)   Date Value   04/03/2021 6     BUN (mg/dL)   Date Value   04/03/2021 11      (goal A1C is < 7)   (goal LDL is <100) need 30-50% reduction from baseline     BP Readings from Last 3 Encounters:   09/18/21 121/73   09/15/21 115/71   04/07/21 (!) 113/42    (goal /80)      All Future Testing planned in [facility]:  Lab Frequency Next Occurrence            Patient Active Problem List:     Depression     KLAUDIA (stress urinary incontinence, female)     Hyperlipidemia     ASCUS of cervix with negative high risk HPV

## 2022-01-21 ENCOUNTER — TELEPHONE (OUTPATIENT)
Dept: INTERNAL MEDICINE | Age: 57
End: 2022-01-21

## 2022-01-21 NOTE — TELEPHONE ENCOUNTER
PC to patient to see if she would like mammogram order and rescheduled patient refused and stated she isn't due.

## 2022-03-27 ENCOUNTER — HOSPITAL ENCOUNTER (EMERGENCY)
Age: 57
Discharge: HOME | End: 2022-03-27
Attending: EMERGENCY MEDICINE
Payer: COMMERCIAL

## 2022-03-27 ENCOUNTER — APPOINTMENT (OUTPATIENT)
Dept: CT IMAGING | Age: 57
End: 2022-03-27
Payer: COMMERCIAL

## 2022-03-27 VITALS
SYSTOLIC BLOOD PRESSURE: 132 MMHG | HEIGHT: 68 IN | WEIGHT: 215 LBS | OXYGEN SATURATION: 98 % | DIASTOLIC BLOOD PRESSURE: 83 MMHG | BODY MASS INDEX: 32.58 KG/M2 | HEART RATE: 88 BPM | RESPIRATION RATE: 22 BRPM | TEMPERATURE: 97.4 F

## 2022-03-27 DIAGNOSIS — R51.9 NONINTRACTABLE EPISODIC HEADACHE, UNSPECIFIED HEADACHE TYPE: Primary | ICD-10-CM

## 2022-03-27 LAB
ABSOLUTE EOS #: 0.3 K/UL (ref 0–0.4)
ABSOLUTE LYMPH #: 1.9 K/UL (ref 1–4.8)
ABSOLUTE MONO #: 0.4 K/UL (ref 0.1–1.3)
ANION GAP SERPL CALCULATED.3IONS-SCNC: 10 MMOL/L (ref 9–17)
BASOPHILS # BLD: 1 % (ref 0–2)
BASOPHILS ABSOLUTE: 0.1 K/UL (ref 0–0.2)
BUN BLDV-MCNC: 11 MG/DL (ref 6–20)
C-REACTIVE PROTEIN: <3 MG/L (ref 0–5)
CALCIUM SERPL-MCNC: 9.2 MG/DL (ref 8.6–10.4)
CHLORIDE BLD-SCNC: 107 MMOL/L (ref 98–107)
CO2: 25 MMOL/L (ref 20–31)
CREAT SERPL-MCNC: 1.15 MG/DL (ref 0.5–0.9)
EOSINOPHILS RELATIVE PERCENT: 5 % (ref 0–4)
GFR AFRICAN AMERICAN: 59 ML/MIN
GFR NON-AFRICAN AMERICAN: 49 ML/MIN
GFR SERPL CREATININE-BSD FRML MDRD: ABNORMAL ML/MIN/{1.73_M2}
GLUCOSE BLD-MCNC: 89 MG/DL (ref 70–99)
HCT VFR BLD CALC: 34.2 % (ref 36–46)
HEMOGLOBIN: 11.2 G/DL (ref 12–16)
LYMPHOCYTES # BLD: 28 % (ref 24–44)
MCH RBC QN AUTO: 29.1 PG (ref 26–34)
MCHC RBC AUTO-ENTMCNC: 32.7 G/DL (ref 31–37)
MCV RBC AUTO: 88.8 FL (ref 80–100)
MONOCYTES # BLD: 6 % (ref 1–7)
PDW BLD-RTO: 13.7 % (ref 11.5–14.9)
PLATELET # BLD: 271 K/UL (ref 150–450)
PMV BLD AUTO: 8.5 FL (ref 6–12)
POTASSIUM SERPL-SCNC: 3.7 MMOL/L (ref 3.7–5.3)
RBC # BLD: 3.85 M/UL (ref 4–5.2)
SEDIMENTATION RATE, ERYTHROCYTE: 17 MM/HR (ref 0–30)
SEG NEUTROPHILS: 60 % (ref 36–66)
SEGMENTED NEUTROPHILS ABSOLUTE COUNT: 4.1 K/UL (ref 1.3–9.1)
SODIUM BLD-SCNC: 142 MMOL/L (ref 135–144)
WBC # BLD: 6.9 K/UL (ref 3.5–11)

## 2022-03-27 PROCEDURE — 6360000004 HC RX CONTRAST MEDICATION: Performed by: EMERGENCY MEDICINE

## 2022-03-27 PROCEDURE — 96375 TX/PRO/DX INJ NEW DRUG ADDON: CPT

## 2022-03-27 PROCEDURE — 36415 COLL VENOUS BLD VENIPUNCTURE: CPT

## 2022-03-27 PROCEDURE — 6360000002 HC RX W HCPCS: Performed by: EMERGENCY MEDICINE

## 2022-03-27 PROCEDURE — 80048 BASIC METABOLIC PNL TOTAL CA: CPT

## 2022-03-27 PROCEDURE — 99284 EMERGENCY DEPT VISIT MOD MDM: CPT

## 2022-03-27 PROCEDURE — 96374 THER/PROPH/DIAG INJ IV PUSH: CPT

## 2022-03-27 PROCEDURE — 85652 RBC SED RATE AUTOMATED: CPT

## 2022-03-27 PROCEDURE — 86140 C-REACTIVE PROTEIN: CPT

## 2022-03-27 PROCEDURE — 70450 CT HEAD/BRAIN W/O DYE: CPT

## 2022-03-27 PROCEDURE — 70498 CT ANGIOGRAPHY NECK: CPT

## 2022-03-27 PROCEDURE — 2580000003 HC RX 258: Performed by: EMERGENCY MEDICINE

## 2022-03-27 PROCEDURE — 85025 COMPLETE CBC W/AUTO DIFF WBC: CPT

## 2022-03-27 RX ORDER — METOCLOPRAMIDE HYDROCHLORIDE 5 MG/ML
10 INJECTION INTRAMUSCULAR; INTRAVENOUS ONCE
Status: COMPLETED | OUTPATIENT
Start: 2022-03-27 | End: 2022-03-27

## 2022-03-27 RX ORDER — 0.9 % SODIUM CHLORIDE 0.9 %
80 INTRAVENOUS SOLUTION INTRAVENOUS ONCE
Status: COMPLETED | OUTPATIENT
Start: 2022-03-27 | End: 2022-03-27

## 2022-03-27 RX ORDER — BUTALBITAL, ASPIRIN, AND CAFFEINE 50; 325; 40 MG/1; MG/1; MG/1
1 CAPSULE ORAL EVERY 4 HOURS PRN
Qty: 30 CAPSULE | Refills: 0 | Status: SHIPPED | OUTPATIENT
Start: 2022-03-27 | End: 2022-04-05 | Stop reason: ALTCHOICE

## 2022-03-27 RX ORDER — SODIUM CHLORIDE 0.9 % (FLUSH) 0.9 %
10 SYRINGE (ML) INJECTION PRN
Status: DISCONTINUED | OUTPATIENT
Start: 2022-03-27 | End: 2022-03-27 | Stop reason: HOSPADM

## 2022-03-27 RX ORDER — DIPHENHYDRAMINE HYDROCHLORIDE 50 MG/ML
25 INJECTION INTRAMUSCULAR; INTRAVENOUS ONCE
Status: COMPLETED | OUTPATIENT
Start: 2022-03-27 | End: 2022-03-27

## 2022-03-27 RX ADMIN — IOPAMIDOL 75 ML: 755 INJECTION, SOLUTION INTRAVENOUS at 17:53

## 2022-03-27 RX ADMIN — METOCLOPRAMIDE 10 MG: 5 INJECTION, SOLUTION INTRAMUSCULAR; INTRAVENOUS at 17:06

## 2022-03-27 RX ADMIN — SODIUM CHLORIDE 80 ML: 9 INJECTION, SOLUTION INTRAVENOUS at 17:54

## 2022-03-27 RX ADMIN — SODIUM CHLORIDE, PRESERVATIVE FREE 10 ML: 5 INJECTION INTRAVENOUS at 17:53

## 2022-03-27 RX ADMIN — DIPHENHYDRAMINE HYDROCHLORIDE 25 MG: 50 INJECTION, SOLUTION INTRAMUSCULAR; INTRAVENOUS at 17:04

## 2022-03-27 ASSESSMENT — ENCOUNTER SYMPTOMS
DIARRHEA: 0
BACK PAIN: 0
SHORTNESS OF BREATH: 0
SORE THROAT: 0
COLOR CHANGE: 0
BLOOD IN STOOL: 0
TROUBLE SWALLOWING: 0
COUGH: 0
ABDOMINAL PAIN: 0
CONSTIPATION: 0
NAUSEA: 0
VOMITING: 0

## 2022-03-27 NOTE — ED PROVIDER NOTES
[address]  EMERGENCY DEPARTMENT ENCOUNTER    Pt Name: Teresa Riddle  MRN: [id number]  YOB: 1965  Date of evaluation:3/27/22  PCP: Tremaine Bee MD    [address] [place] [address]       Chief Complaint   Patient presents with    Headache    Neck Pain    Shoulder Pain       HISTORY OF PRESENT ILLNESS    Teresa Riddle is a 55 y.o. female who presents with a chief complaint of a headache, neck pain and shoulder pain. Patient states her symptoms have been ongoing for about 1 week. The pain is mostly on the left side of her head around her left temple area and extends to the back of her head, down her neck to both shoulders. No numbness, tingling, weakness in her arms. She has had some blurry vision but no double vision or loss of vision. No dizziness, lightheadedness. No head trauma. She has been taking a lot of Tylenol and Motrin actually more than she should over the past several days which has been helping the pain however the pain comes back a short time later. No fevers, chills or other infectious symptoms. She states she believes she has MS however her doctor has not tested her for this and she stated she tried to get tested at [facility] clinic but was unable to do so. Symptoms are acute. Symptomatic per nothing make symptoms better or worse. Patient has no other complaints at this time. REVIEW OF SYSTEMS       Review of Systems   Constitutional: Negative for chills, fatigue and fever. HENT: Negative for congestion, ear pain, sore throat and trouble swallowing. Eyes: Positive for visual disturbance. Respiratory: Negative for cough and shortness of breath. Cardiovascular: Negative for chest pain, palpitations and leg swelling. Gastrointestinal: Negative for abdominal pain, blood in stool, constipation, diarrhea, nausea and vomiting. Genitourinary: Negative for dysuria and flank pain. Musculoskeletal: Positive for arthralgias and neck pain.  Negative for back pain and myalgias. Skin: Negative for color change, rash and wound. Neurological: Positive for headaches. Negative for dizziness, weakness, light-headedness and numbness. Psychiatric/Behavioral: Negative for confusion. All other systems reviewed and are negative. Negative in 10 essential Systems except as mentioned above and in the HPI. PAST MEDICAL HISTORY     Past Medical History:   Diagnosis Date    Ankle sprain 2017    Anxiety     ASCUS of cervix with negative high risk HPV 9/15/2016    Depression     Hyperlipidemia     Obesity     Osteoarthritis     Pituitary tumor     prolacinoma- treated medically    KLAUDIA (stress urinary incontinence, female) 2014         SURGICAL HISTORY      has a past surgical history that includes Anus surgery; Tonsillectomy; and other surgical history. CURRENT MEDICATIONS       Previous Medications    CHOLECALCIFEROL (VITAMIN D3) 50 MCG (2000 UT) TABS    TAKE 1 TABLET BY MOUTH DAILY     ESCITALOPRAM (LEXAPRO) 20 MG TABLET    TAKE 1 TAB BY MOUTH ONCE A DAY    FOLIC ACID (FOLVITE) 1 MG TABLET    TAKE 1 TABLET BY MOUTH DAILY     IBUPROFEN (ADVIL;MOTRIN) 200 MG TABLET    Take 200 mg by mouth every 6 hours as needed for Pain    LORATADINE-PSEUDOEPHEDRINE (CLARITIN-D 12 HOUR) 5-120 MG PER EXTENDED RELEASE TABLET    Take 1 tablet by mouth 2 times daily    MULTIPLE VITAMIN (MULTIVITAMIN ADULT PO)    Take by mouth    PRAVASTATIN (PRAVACHOL) 40 MG TABLET    Take 1 tablet by mouth daily    TRAZODONE (DESYREL) 100 MG TABLET    Take 1 tablet by mouth nightly    VITAMIN B-12 (CYANOCOBALAMIN) 1000 MCG TABLET    Take 1 tablet by mouth daily    ZINC 50 MG CAPS    Take by mouth       ALLERGIES     is allergic to bactrim [sulfamethoxazole-trimethoprim]. FAMILY HISTORY     She indicated that her mother is . She indicated that her father is . She indicated that all of her three brothers are alive.  She indicated that her maternal grandmother is . She indicated that the status of her paternal aunt is unknown.     family history includes Breast Cancer in her paternal aunt; COPD in her father; Cancer in her maternal grandmother and mother; Other in her father and mother. SOCIAL HISTORY      reports that she has never smoked. She has never used smokeless tobacco. She reports current alcohol use. She reports that she does not use drugs. PHYSICAL EXAM     INITIAL VITALS:  height is 5' 8\" (1.727 m) and weight is 215 lb (97.5 kg). Her oral temperature is 97.4 °F (36.3 °C). Her blood pressure is 132/83 and her pulse is 88. Her respiration is 22 and oxygen saturation is 98%. Physical Exam  Vitals and nursing note reviewed. Constitutional:       General: She is not in acute distress. HENT:      Head: Normocephalic and atraumatic. Comments: No temporal tenderness  Eyes:      Conjunctiva/sclera: Conjunctivae normal.      Pupils: Pupils are equal, round, and reactive to light. Cardiovascular:      Rate and Rhythm: Normal rate and regular rhythm. Heart sounds: Normal heart sounds. No murmur heard. Pulmonary:      Effort: Pulmonary effort is normal. No respiratory distress. Breath sounds: Normal breath sounds. Abdominal:      General: Bowel sounds are normal. There is no distension. Palpations: Abdomen is soft. Tenderness: There is no abdominal tenderness. Musculoskeletal:         General: No tenderness. Cervical back: Neck supple. No rigidity. Lymphadenopathy:      Cervical: No cervical adenopathy. Skin:     General: Skin is warm and dry. Findings: No rash. Neurological:      General: No focal deficit present. Mental Status: She is alert and oriented to person, place, and time. Psychiatric:         Judgment: Judgment normal.           DIFFERENTIAL DIAGNOSIS/MDM:   76-year-old female presents with about a week of left-sided headache, neck pain and shoulder pain.   She is afebrile, nontoxic, normal vital signs. No acute distress. Her blood pressure is just mildly above normal.    Clinically have a low suspicion for CVA, dissection, temporal arteritis. I think more likely this is a complex migraine, tension headache, possibly secondary to undiagnosed MS. We will get lab work, CTA head, head and neck, sed rate and inflammatory markers. DIAGNOSTIC RESULTS     EKG: All EKG's are interpreted by the Emergency Department Physician who either signs or Co-signs this chart in the absence of a cardiologist.        RADIOLOGY:   I directly visualized the following  images and reviewed the radiologist interpretations:  CTA HEAD NECK W CONTRAST   Final Result   1. No acute arterial abnormality or hemodynamically significant arterial   stenosis in the head or neck. 2. Mild degenerative changes in the cervical spine. CT HEAD WO CONTRAST   Final Result      No acute intracranial abnormality noted. ED BEDSIDE ULTRASOUND:      LABS:  Labs Reviewed   CBC WITH AUTO DIFFERENTIAL - Abnormal; Notable for the following components:       Result Value    RBC 3.85 (*)     Hemoglobin 11.2 (*)     Hematocrit 34.2 (*)     Eosinophils % 5 (*)     All other components within normal limits   BASIC METABOLIC PANEL - Abnormal; Notable for the following components:    CREATININE 1.15 (*)     GFR Non- 49 (*)     GFR  59 (*)     All other components within normal limits   SEDIMENTATION RATE   C-REACTIVE PROTEIN         EMERGENCY DEPARTMENT COURSE:   Vitals:    Vitals:    03/27/22 1547   BP: 132/83   Pulse: 88   Resp: 22   Temp: 97.4 °F (36.3 °C)   TempSrc: Oral   SpO2: 98%   Weight: 215 lb (97.5 kg)   Height: 5' 8\" (1.727 m)     Work-up is unremarkable here. CT head, CTA head and neck unremarkable. No evidence of CVA, dissection, aneurysm. Clinically patient looks very well. She is not in any distress. Very low suspicion for subarachnoid hemorrhage.     Her sed rate is normal.  Low suspicion for temporal arteritis. Will treat as a migraine headache. Advised to follow-up with her PCP, return if any symptoms worsen. She is agreeable plan will be discharged home today. CRITICALCARE:      CONSULTS:  None      PROCEDURES:      FINAL IMPRESSION      1. Nonintractable episodic headache, unspecified headache type            DISPOSITION/PLAN   DISPOSITION Decision To Discharge 03/27/2022 06:28:47 PM        PATIENT REFERRED TO:  MD Hardy [facility] [address] [facility] [address] [id number] [phone]    Schedule an appointment as soon as possible for a visit       [facility] ED  [facility] [address] [phone]    As needed, If symptoms worsen      DISCHARGE MEDICATIONS:  New Prescriptions    BUTALBITAL-ASPIRIN-CAFFEINE (FIORINAL) -40 MG PER CAPSULE    Take 1 capsule by mouth every 4 hours as needed for Headaches for up to 7 days. The care is provided during an unprecedented national emergency due to the novel coronavirus, COVID-19.     (Please note that portions ofthis note were completed with a voice recognition program.  Efforts were made to edit the dictations but occasionally words are mis-transcribed.)    Jay Jay Sabillon DO  Attending Emergency Physician          Jay Jay Sabillon DO  03/27/22 6572

## 2022-03-27 NOTE — ED TRIAGE NOTES
Mode of arrival (squad #, walk in, police, etc) : walk in        Chief complaint(s): headache, shoulder pain, headache        Arrival Note (brief scenario, treatment PTA, etc). : pt reports she has had a headache on and off for about a week. Pt. Reports she had a few migraines in the past but they haven't lingered this long. Pt. Does not take medication for migraines or follow up with a neurologist. Pt. States she pain from the headache radiates from her head down to her neck/ shoulders. Pt. States she took 1800 mg of advil this morning for the pain. C= \"Have you ever felt that you should Cut down on your drinking? \"  No  A= \"Have people Annoyed you by criticizing your drinking? \"  No  G= \"Have you ever felt bad or Guilty about your drinking? \"  No  E= \"Have you ever had a drink as an Eye-opener first thing in the morning to steady your nerves or to help a hangover? \"  No      Deferred []      Reason for deferring: N/A    *If yes to two or more: probable alcohol abuse. *

## 2022-04-04 DIAGNOSIS — G47.09 OTHER INSOMNIA: ICD-10-CM

## 2022-04-04 DIAGNOSIS — F32.A MILD DEPRESSION: ICD-10-CM

## 2022-04-04 DIAGNOSIS — F41.9 ANXIETY: ICD-10-CM

## 2022-04-04 DIAGNOSIS — E78.00 PURE HYPERCHOLESTEROLEMIA: ICD-10-CM

## 2022-04-04 NOTE — TELEPHONE ENCOUNTER
E-scribe request for Pravastatin and Lexapro. Please review and e-scribe if applicable.        Next Visit Date:  Future Appointments   Date Time Provider Artis Harding   4/5/2022  8:30 AM Victor Hugo Alcocer  [address] Maintenance   Topic Date Due    COVID-19 Vaccine (1) Never done    Shingles Vaccine (1 of 2) Never done    Breast cancer screen  07/24/2021    Cervical cancer screen  08/30/2021    Depression Monitoring  09/18/2021    Annual Wellness Visit (AWV)  09/19/2021    Lipid screen  03/19/2022    DTaP/Tdap/Td vaccine (1 - Tdap) 07/24/2024 (Originally 7/25/2014)    Flu vaccine (Season Ended) 09/01/2022    Colorectal Cancer Screen  09/20/2023    Hepatitis C screen  Completed    HIV screen  Completed    Hepatitis A vaccine  Aged Out    Hepatitis B vaccine  Aged Out    Hib vaccine  Aged Out    Meningococcal (ACWY) vaccine  Aged Out    Pneumococcal 0-64 years Vaccine  Aged Out               (applicable per patient's age: Cancer Screenings, Depression Screening, Fall Risk Screening, Immunizations)    LDL Cholesterol (mg/dL)   Date Value   03/19/2021 88     AST (U/L)   Date Value   04/03/2021 17     ALT (U/L)   Date Value   04/03/2021 6     BUN (mg/dL)   Date Value   03/27/2022 11      (goal A1C is < 7)   (goal LDL is <100) need 30-50% reduction from baseline     BP Readings from Last 3 Encounters:   03/27/22 132/83   09/18/21 121/73   09/15/21 115/71    (goal /80)      All Future Testing planned in [facility]:  Lab Frequency Next Occurrence            Patient Active Problem List:     Depression     KLAUDIA (stress urinary incontinence, female)     Hyperlipidemia     ASCUS of cervix with negative high risk HPV
No

## 2022-04-05 ENCOUNTER — HOSPITAL ENCOUNTER (OUTPATIENT)
Age: 57
Setting detail: SPECIMEN
Discharge: HOME | End: 2022-04-05

## 2022-04-05 ENCOUNTER — TELEPHONE (OUTPATIENT)
Dept: INTERNAL MEDICINE | Age: 57
End: 2022-04-05

## 2022-04-05 ENCOUNTER — OFFICE VISIT (OUTPATIENT)
Dept: INTERNAL MEDICINE | Age: 57
End: 2022-04-05
Payer: COMMERCIAL

## 2022-04-05 VITALS
SYSTOLIC BLOOD PRESSURE: 114 MMHG | DIASTOLIC BLOOD PRESSURE: 73 MMHG | WEIGHT: 221 LBS | HEART RATE: 85 BPM | BODY MASS INDEX: 33.6 KG/M2

## 2022-04-05 DIAGNOSIS — Z12.31 SCREENING MAMMOGRAM FOR BREAST CANCER: ICD-10-CM

## 2022-04-05 DIAGNOSIS — E53.8 LOW FOLIC ACID: ICD-10-CM

## 2022-04-05 DIAGNOSIS — F41.9 ANXIETY: ICD-10-CM

## 2022-04-05 DIAGNOSIS — E78.00 PURE HYPERCHOLESTEROLEMIA: ICD-10-CM

## 2022-04-05 DIAGNOSIS — R73.9 HYPERGLYCEMIA: ICD-10-CM

## 2022-04-05 DIAGNOSIS — R51.9 CHRONIC LEFT-SIDED HEADACHE: ICD-10-CM

## 2022-04-05 DIAGNOSIS — D64.9 NORMOCYTIC ANEMIA: ICD-10-CM

## 2022-04-05 DIAGNOSIS — F32.A MILD DEPRESSION: ICD-10-CM

## 2022-04-05 DIAGNOSIS — E78.00 PURE HYPERCHOLESTEROLEMIA: Primary | ICD-10-CM

## 2022-04-05 DIAGNOSIS — G89.29 CHRONIC LEFT-SIDED HEADACHE: ICD-10-CM

## 2022-04-05 LAB
ALBUMIN SERPL-MCNC: 4.2 G/DL (ref 3.5–5.2)
ALBUMIN/GLOBULIN RATIO: 1.3 (ref 1–2.5)
ALP BLD-CCNC: 99 U/L (ref 35–104)
ALT SERPL-CCNC: 17 U/L (ref 5–33)
ANION GAP SERPL CALCULATED.3IONS-SCNC: 11 MMOL/L (ref 9–17)
AST SERPL-CCNC: 21 U/L
BILIRUB SERPL-MCNC: 0.24 MG/DL (ref 0.3–1.2)
BUN BLDV-MCNC: 16 MG/DL (ref 6–20)
CALCIUM SERPL-MCNC: 8.9 MG/DL (ref 8.6–10.4)
CHLORIDE BLD-SCNC: 106 MMOL/L (ref 98–107)
CHOLESTEROL/HDL RATIO: 2.7
CHOLESTEROL: 230 MG/DL
CO2: 25 MMOL/L (ref 20–31)
CREAT SERPL-MCNC: 0.93 MG/DL (ref 0.5–0.9)
ESTIMATED AVERAGE GLUCOSE: 105 MG/DL
FERRITIN: 18 NG/ML (ref 13–150)
FOLATE: 10.8 NG/ML
GFR AFRICAN AMERICAN: >60 ML/MIN
GFR NON-AFRICAN AMERICAN: >60 ML/MIN
GFR SERPL CREATININE-BSD FRML MDRD: ABNORMAL ML/MIN/{1.73_M2}
GLUCOSE BLD-MCNC: 86 MG/DL (ref 70–99)
HBA1C MFR BLD: 5.3 % (ref 4–6)
HDLC SERPL-MCNC: 86 MG/DL
IRON SATURATION: 18 % (ref 20–55)
IRON: 56 UG/DL (ref 37–145)
LDL CHOLESTEROL: 121 MG/DL (ref 0–130)
POTASSIUM SERPL-SCNC: 4 MMOL/L (ref 3.7–5.3)
SODIUM BLD-SCNC: 142 MMOL/L (ref 135–144)
TOTAL IRON BINDING CAPACITY: 307 UG/DL (ref 250–450)
TOTAL PROTEIN: 7.4 G/DL (ref 6.4–8.3)
TRIGL SERPL-MCNC: 114 MG/DL
UNSATURATED IRON BINDING CAPACITY: 251 UG/DL (ref 112–347)
VITAMIN B-12: 424 PG/ML (ref 232–1245)

## 2022-04-05 PROCEDURE — 99214 OFFICE O/P EST MOD 30 MIN: CPT | Performed by: INTERNAL MEDICINE

## 2022-04-05 PROCEDURE — 99211 OFF/OP EST MAY X REQ PHY/QHP: CPT | Performed by: INTERNAL MEDICINE

## 2022-04-05 RX ORDER — PRAVASTATIN SODIUM 40 MG
40 TABLET ORAL DAILY
Qty: 30 TABLET | Refills: 5 | Status: SHIPPED | OUTPATIENT
Start: 2022-04-05 | End: 2022-10-07 | Stop reason: SDUPTHER

## 2022-04-05 RX ORDER — SUMATRIPTAN 50 MG/1
50 TABLET, FILM COATED ORAL 2 TIMES DAILY
Qty: 9 TABLET | Refills: 0 | Status: SHIPPED | OUTPATIENT
Start: 2022-04-05

## 2022-04-05 RX ORDER — ESCITALOPRAM OXALATE 20 MG/1
TABLET ORAL
Qty: 30 TABLET | Refills: 5 | Status: SHIPPED | OUTPATIENT
Start: 2022-04-05

## 2022-04-05 SDOH — ECONOMIC STABILITY: FOOD INSECURITY: WITHIN THE PAST 12 MONTHS, YOU WORRIED THAT YOUR FOOD WOULD RUN OUT BEFORE YOU GOT MONEY TO BUY MORE.: NEVER TRUE

## 2022-04-05 SDOH — ECONOMIC STABILITY: FOOD INSECURITY: WITHIN THE PAST 12 MONTHS, THE FOOD YOU BOUGHT JUST DIDN'T LAST AND YOU DIDN'T HAVE MONEY TO GET MORE.: NEVER TRUE

## 2022-04-05 ASSESSMENT — PATIENT HEALTH QUESTIONNAIRE - PHQ9
3. TROUBLE FALLING OR STAYING ASLEEP: 1
10. IF YOU CHECKED OFF ANY PROBLEMS, HOW DIFFICULT HAVE THESE PROBLEMS MADE IT FOR YOU TO DO YOUR WORK, TAKE CARE OF THINGS AT HOME, OR GET ALONG WITH OTHER PEOPLE: 0
SUM OF ALL RESPONSES TO PHQ QUESTIONS 1-9: 2
4. FEELING TIRED OR HAVING LITTLE ENERGY: 0
2. FEELING DOWN, DEPRESSED OR HOPELESS: 1
SUM OF ALL RESPONSES TO PHQ QUESTIONS 1-9: 2
8. MOVING OR SPEAKING SO SLOWLY THAT OTHER PEOPLE COULD HAVE NOTICED. OR THE OPPOSITE, BEING SO FIGETY OR RESTLESS THAT YOU HAVE BEEN MOVING AROUND A LOT MORE THAN USUAL: 0
9. THOUGHTS THAT YOU WOULD BE BETTER OFF DEAD, OR OF HURTING YOURSELF: 0
SUM OF ALL RESPONSES TO PHQ9 QUESTIONS 1 & 2: 1
1. LITTLE INTEREST OR PLEASURE IN DOING THINGS: 0
7. TROUBLE CONCENTRATING ON THINGS, SUCH AS READING THE NEWSPAPER OR WATCHING TELEVISION: 0
5. POOR APPETITE OR OVEREATING: 0
SUM OF ALL RESPONSES TO PHQ QUESTIONS 1-9: 2
SUM OF ALL RESPONSES TO PHQ QUESTIONS 1-9: 2
6. FEELING BAD ABOUT YOURSELF - OR THAT YOU ARE A FAILURE OR HAVE LET YOURSELF OR YOUR FAMILY DOWN: 0

## 2022-04-05 ASSESSMENT — ENCOUNTER SYMPTOMS
CONSTIPATION: 0
EYE REDNESS: 0
ABDOMINAL PAIN: 0
EYE PAIN: 0
SHORTNESS OF BREATH: 0
PHOTOPHOBIA: 0
WHEEZING: 0
EYE DISCHARGE: 0
COUGH: 0
BLOOD IN STOOL: 0

## 2022-04-05 ASSESSMENT — SOCIAL DETERMINANTS OF HEALTH (SDOH): HOW HARD IS IT FOR YOU TO PAY FOR THE VERY BASICS LIKE FOOD, HOUSING, MEDICAL CARE, AND HEATING?: NOT HARD AT ALL

## 2022-04-05 NOTE — PROGRESS NOTES
which was negative. She has not had a Pap smear but plans to go back to [facility]. She denies any pelvic pain or vaginal discharge. She had a mammogram and a breast biopsy which was negative couple of years ago. She is due for a Pap smear. She had Cologuard testing in 2020. No blood in her stools. No constipation. She is worried she has multiple sclerosis as she gets squeezing pain around her waist.  She says she wants to go to [facility] clinic but is dealing with another clinic regarding this issue and would not go into details with        CTA brain 2022  Impression   1. No acute arterial abnormality or hemodynamically significant arterial   stenosis in the head or neck. 2. Mild degenerative changes in the cervical spine.         Ct brain 2022  FINDINGS:       Brain:  No acute findings.  No hemorrhage.  No significant white matter   disease.  No edema.       Ventricles:  No acute findings.  No ventriculomegaly.       Bones/joints:  No acute findings.  No acute fracture.       Soft tissues:  No acute findings.       Sinuses:  Mild left anterior ethmoid chronic sinusitis.       Mastoid air cells:  Unremarkable as visualized.  No mastoid effusion.           Impression       No acute intracranial abnormality noted.          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    KLAUDIA (stress urinary incontinence, female) 8/19/2014       Past Surgical History:   Procedure Laterality Date    ANUS SURGERY      cyst I and D    OTHER SURGICAL HISTORY      cervical biopsy    TONSILLECTOMY           ALLERGIES      Allergies   Allergen Reactions    Bactrim [Sulfamethoxazole-Trimethoprim] Other (See Comments)     Blood in stools       MEDICATIONS:      Current Outpatient Medications on File Prior to Visit   Medication Sig Dispense Refill    folic acid (FOLVITE) 1 MG tablet TAKE 1 TABLET BY MOUTH DAILY  30 tablet 4    Cholecalciferol (VITAMIN D3) 50 MCG (2000 UT) TABS TAKE 1 TABLET BY MOUTH DAILY  30 tablet 4    Multiple Vitamin (MULTIVITAMIN ADULT PO) Take by mouth      ibuprofen (ADVIL;MOTRIN) 200 MG tablet Take 200 mg by mouth every 6 hours as needed for Pain      zinc 50 MG CAPS Take by mouth      vitamin B-12 (CYANOCOBALAMIN) 1000 MCG tablet Take 1 tablet by mouth daily 30 tablet 5    traZODone (DESYREL) 100 MG tablet Take 1 tablet by mouth nightly 30 tablet 5    pravastatin (PRAVACHOL) 40 MG tablet Take 1 tablet by mouth daily 30 tablet 5    escitalopram (LEXAPRO) 20 MG tablet TAKE 1 TAB BY MOUTH ONCE A DAY 30 tablet 5     No current facility-administered medications on file prior to visit. HISTORY    Reviewed and no change from previous record. Javon Fajardo  reports that she has never smoked. She has never used smokeless tobacco.    FAMILY HISTORY:    Reviewed and No change from previous visit    HEALTH MAINTENANCE DUE:      Health Maintenance Due   Topic Date Due    COVID-19 Vaccine (1) Never done    Breast cancer screen  07/24/2021    Cervical cancer screen  08/30/2021    Annual Wellness Visit (AWV)  09/19/2021    Lipid screen  03/19/2022       REVIEW OF SYSTEMS:    12 point review of symptoms completed and found to be normal except noted in the HPI    Review of Systems   Constitutional: Negative for chills, diaphoresis, fatigue and unexpected weight change. Eyes: Negative for photophobia, pain, discharge, redness and visual disturbance. Respiratory: Negative for cough, shortness of breath and wheezing. Cardiovascular: Negative for chest pain, palpitations and leg swelling. Gastrointestinal: Negative for abdominal pain, blood in stool and constipation. Endocrine: Negative for polydipsia and polyuria. Genitourinary: Positive for urgency.  Negative for dysuria and menstrual problem. Neurological: Positive for headaches. Negative for dizziness, syncope, weakness and numbness. Hematological: Negative for adenopathy. Does not bruise/bleed easily. Psychiatric/Behavioral: Positive for sleep disturbance. Negative for dysphoric mood and self-injury. The patient is nervous/anxious. PHYSICAL EXAM:     Vitals:    04/05/22 0818   BP: 114/73   Site: Right Upper Arm   Position: Sitting   Cuff Size: Large Adult   Pulse: 85   Weight: 221 lb (100.2 kg)     Body mass index is 33.6 kg/m². BP Readings from Last 3 Encounters:   04/05/22 114/73   03/27/22 132/83   09/18/21 121/73        Wt Readings from Last 3 Encounters:   04/05/22 221 lb (100.2 kg)   03/27/22 215 lb (97.5 kg)   09/18/21 215 lb (97.5 kg)       Physical Exam  Vitals and nursing note reviewed. Constitutional:       Appearance: Normal appearance. She is obese. HENT:      Head: Normocephalic and atraumatic. Eyes:      General: No scleral icterus. Extraocular Movements: Extraocular movements intact. Conjunctiva/sclera: Conjunctivae normal.      Pupils: Pupils are equal, round, and reactive to light. Cardiovascular:      Rate and Rhythm: Normal rate and regular rhythm. Heart sounds: No murmur heard. Pulmonary:      Effort: Pulmonary effort is normal.      Breath sounds: Normal breath sounds. No wheezing. Musculoskeletal:      Cervical back: Normal range of motion and neck supple. Right lower leg: No edema. Left lower leg: No edema. Skin:     General: Skin is warm and dry. Neurological:      General: No focal deficit present. Mental Status: She is alert and oriented to person, place, and time.                 LABORATORY FINDINGS:    CBC:  Lab Results   Component Value Date    WBC 6.9 03/27/2022    HGB 11.2 03/27/2022     03/27/2022     05/05/2012     BMP:    Lab Results   Component Value Date     03/27/2022    K 3.7 03/27/2022     03/27/2022    CO2 25 03/27/2022    BUN 11 03/27/2022    CREATININE 1.15 03/27/2022    GLUCOSE 89 03/27/2022    GLUCOSE 76 05/05/2012     HEMOGLOBIN A1C: No results found for: LABA1C  MICROALBUMIN URINE: No results found for: MICROALBUR  FASTING LIPID PANEL:  Lab Results   Component Value Date    CHOL 184 03/19/2021    HDL 84 03/19/2021    TRIG 62 03/19/2021     Lab Results   Component Value Date    LDLCHOLESTEROL 88 03/19/2021       LIVER PROFILE:  Lab Results   Component Value Date    ALT 6 04/03/2021    AST 17 04/03/2021    PROT 6.8 04/03/2021    BILITOT 0.19 04/03/2021    BILIDIR <0.08 12/14/2016    LABALBU 3.9 04/03/2021    LABALBU 4.6 05/05/2012      THYROID FUNCTION:   Lab Results   Component Value Date    TSH 1.61 03/19/2021      URINEANALYSIS: No results found for: LABURIN  ASSESSMENT AND PLAN:    1. Pure hypercholesterolemia    - Lipid Panel; Future  - Comprehensive Metabolic Panel; Future    2. Anxiety  Not taking meds regularly    3. Chronic left-sided headache  May be muscular in origin  Will try imitrex as it is hemicranial and reassess    - SUMAtriptan (IMITREX) 50 MG tablet; Take 1 tablet by mouth in the morning and at bedtime Maximum 200 mg/day for migraine  Dispense: 9 tablet; Refill: 0    4. Mild depression ([address]  Denies needs    - Comprehensive Metabolic Panel; Future    5. Normocytic anemia    - Vitamin B12 & Folate; Future  - Iron and TIBC; Future  - Ferritin; Future    6. Low folic acid    - Vitamin B12 & Folate; Future    7. Hyperglycemia    - Hemoglobin A1C; Future    8. Screening mammogram for breast cancer    - LINDA DIGITAL SCREEN W OR WO CAD BILATERAL; Future          FOLLOW UP AND INSTRUCTIONS:   Return in about 6 months (around 10/5/2022). 1. Jeffery Lynch received counseling on the following healthy behaviors: nutrition, exercise and medication adherence    2. Reviewed prior labs and health maintenance. 3. Discussed use, benefit, and side effects of prescribed medications.   Barriers to medication compliance addressed. All patient questions answered. Pt voiced understanding.        Linnette Hu  Attending Physician, [address] [place] [address], Internal Medicine Residency Program  [address] [place] [address]  4/5/2022, 8:52 AM

## 2022-04-05 NOTE — TELEPHONE ENCOUNTER
The patient called because she was just seen by Dr. Suleman Boland today and Dr. Suleman Boland prescribed Imitrex. The patient is wondering if Dr. Suleman Boland would also be able to prescribe Excedrin Migraine? She spoke with the pharmacist and they recommended the medication for the patient.

## 2022-04-05 NOTE — PATIENT INSTRUCTIONS
-Pt due for 6 month f/u in October-- pt to call in [facility] to set up an appt--reminder in [facility] to contact patient as well--AVS given to patient    -Bloodwork orders given to patient, they will have them done before their next visit.     -Your doctor has ordered an Mammogram for you, please contact our scheduling department at [phone] to set up an appointment.    -Abelardo Garcia

## 2022-04-06 NOTE — TELEPHONE ENCOUNTER
PC from patient - informed patient of recommendations of Imitrex. Patient states Imitrex causes her to go to sleep. Patient was requesting an alternative for times where she is not able to go to sleep.

## 2022-04-14 DIAGNOSIS — G47.09 OTHER INSOMNIA: ICD-10-CM

## 2022-04-14 RX ORDER — TRAZODONE HYDROCHLORIDE 100 MG/1
100 TABLET ORAL NIGHTLY
Qty: 30 TABLET | Refills: 1 | Status: SHIPPED | OUTPATIENT
Start: 2022-04-14 | End: 2022-06-28

## 2022-04-14 NOTE — TELEPHONE ENCOUNTER
Request for Trazodone.       Next Visit Date:  Future Appointments   Date Time Provider Artis Graciai   4/21/2022 11:30 AM SCHEDULE, P [facility] IM NURSE [address] Maintenance   Topic Date Due    COVID-19 Vaccine (1) Never done    Breast cancer screen  07/24/2021    Cervical cancer screen  08/30/2021    Annual Wellness Visit (AWV)  09/19/2021    Shingles Vaccine (1 of 2) 04/05/2023 (Originally 7/25/2016)    DTaP/Tdap/Td vaccine (1 - Tdap) 07/24/2024 (Originally 7/25/2014)    Flu vaccine (Season Ended) 09/01/2022    Lipid screen  04/05/2023    Depression Monitoring  04/05/2023    Colorectal Cancer Screen  09/20/2023    Hepatitis C screen  Completed    HIV screen  Completed    Hepatitis A vaccine  Aged Out    Hepatitis B vaccine  Aged Out    Hib vaccine  Aged Out    Meningococcal (ACWY) vaccine  Aged Out    Pneumococcal 0-64 years Vaccine  Aged Out       Hemoglobin A1C (%)   Date Value   04/05/2022 5.3             ( goal A1C is < 7)   No results found for: LABMICR  LDL Cholesterol (mg/dL)   Date Value   04/05/2022 121       (goal LDL is <100)   AST (U/L)   Date Value   04/05/2022 21     ALT (U/L)   Date Value   04/05/2022 17     BUN (mg/dL)   Date Value   04/05/2022 16     BP Readings from Last 3 Encounters:   04/05/22 114/73   03/27/22 132/83   09/18/21 121/73          (goal 120/80)    All Future Testing planned in [facility]  Lab Frequency Next Occurrence   LINDA DIGITAL SCREEN W OR WO CAD BILATERAL Once 07/14/2022         Patient Active Problem List:     Mild depression ([address]     KLAUDIA (stress urinary incontinence, female)     Hyperlipidemia     ASCUS of cervix with negative high risk HPV

## 2022-04-21 ENCOUNTER — TELEMEDICINE (OUTPATIENT)
Dept: INTERNAL MEDICINE | Age: 57
End: 2022-04-21
Payer: COMMERCIAL

## 2022-04-21 DIAGNOSIS — Z00.00 MEDICARE ANNUAL WELLNESS VISIT, SUBSEQUENT: Primary | ICD-10-CM

## 2022-04-21 PROCEDURE — G0439 PPPS, SUBSEQ VISIT: HCPCS | Performed by: INTERNAL MEDICINE

## 2022-04-21 ASSESSMENT — PATIENT HEALTH QUESTIONNAIRE - PHQ9
2. FEELING DOWN, DEPRESSED OR HOPELESS: 1
5. POOR APPETITE OR OVEREATING: 0
6. FEELING BAD ABOUT YOURSELF - OR THAT YOU ARE A FAILURE OR HAVE LET YOURSELF OR YOUR FAMILY DOWN: 0
9. THOUGHTS THAT YOU WOULD BE BETTER OFF DEAD, OR OF HURTING YOURSELF: 0
SUM OF ALL RESPONSES TO PHQ9 QUESTIONS 1 & 2: 1
SUM OF ALL RESPONSES TO PHQ QUESTIONS 1-9: 2
1. LITTLE INTEREST OR PLEASURE IN DOING THINGS: 0
SUM OF ALL RESPONSES TO PHQ QUESTIONS 1-9: 2
3. TROUBLE FALLING OR STAYING ASLEEP: 1
4. FEELING TIRED OR HAVING LITTLE ENERGY: 0
7. TROUBLE CONCENTRATING ON THINGS, SUCH AS READING THE NEWSPAPER OR WATCHING TELEVISION: 0
8. MOVING OR SPEAKING SO SLOWLY THAT OTHER PEOPLE COULD HAVE NOTICED. OR THE OPPOSITE, BEING SO FIGETY OR RESTLESS THAT YOU HAVE BEEN MOVING AROUND A LOT MORE THAN USUAL: 0
SUM OF ALL RESPONSES TO PHQ QUESTIONS 1-9: 2
10. IF YOU CHECKED OFF ANY PROBLEMS, HOW DIFFICULT HAVE THESE PROBLEMS MADE IT FOR YOU TO DO YOUR WORK, TAKE CARE OF THINGS AT HOME, OR GET ALONG WITH OTHER PEOPLE: 0
SUM OF ALL RESPONSES TO PHQ QUESTIONS 1-9: 2

## 2022-04-21 ASSESSMENT — LIFESTYLE VARIABLES
HOW MANY STANDARD DRINKS CONTAINING ALCOHOL DO YOU HAVE ON A TYPICAL DAY: 1 OR 2
HOW OFTEN DO YOU HAVE A DRINK CONTAINING ALCOHOL: MONTHLY OR LESS

## 2022-04-21 NOTE — PROGRESS NOTES
Medicare Annual Wellness Visit    Cara Muniz is here for Medicare AWV    Assessment & Plan   Medicare annual wellness visit, subsequent      Recommendations for Preventive Services Due: see orders and patient instructions/AVS.  Recommended screening schedule for the next 5-10 years is provided to the patient in written form: see Patient Instructions/AVS.     No follow-ups on file. Subjective     Patient's complete Health Risk Assessment and screening values have been reviewed and are found in Flowsheets. The following problems were reviewed today and where indicated follow up appointments were made and/or referrals ordered.     Positive Risk Factor Screenings with Interventions:             General Health and ACP:  General  In general, how would you say your health is?: Good  In the past 7 days, have you experienced any of the following: New or Increased Pain, New or Increased Fatigue, Loneliness, Social Isolation, Stress or Anger?: No  Do you get the social and emotional support that you need?: Yes (family, friends, Restoration[employer] family)  Do you have a Living Will?: (!) No    Advance Directives     Power of  Living Will ACP-Advance Directive ACP-Power of     Not on File Not on File Not on File Not on File      General Health Risk Interventions:  · No Living Will: Patient declines ACP discussion/assistance    Health Habits/Nutrition:     Physical Activity: Insufficiently Active    Days of Exercise per Week: 2 days    Minutes of Exercise per Session: 40 min     Have you lost any weight without trying in the past 3 months?: No     Have you seen the dentist within the past year?: (!) No ([facility])    Health Habits/Nutrition Interventions:  · Dental exam overdue:  patient encouraged to make appointment with his/her dentist             Objective      Patient-Reported Vitals  BP Observations: No, remote/electronic monitoring device was not used or able to be verified  Patient-Reported Weight: 221 lb  Patient-Reported Height: 5' 8\"              Allergies   Allergen Reactions    Dye [Iodides] Rash     Skin burned, raised rash on body    Bactrim [Sulfamethoxazole-Trimethoprim] Other (See Comments)     Blood in stools     Prior to Visit Medications    Medication Sig Taking? Authorizing Provider   traZODone (DESYREL) 100 MG tablet Take 1 tablet by mouth nightly Yes Kristi Zepeda MD   pravastatin (PRAVACHOL) 40 MG tablet Take 1 tablet by mouth daily Yes Kristi Zepeda MD   escitalopram (LEXAPRO) 20 MG tablet TAKE 1 TAB BY MOUTH ONCE A DAY Yes Kristi Zepeda MD   SUMAtriptan (IMITREX) 50 MG tablet Take 1 tablet by mouth in the morning and at bedtime Maximum 200 mg/day for migraine Yes Kristi Zepeda MD   folic acid (FOLVITE) 1 MG tablet TAKE 1 TABLET BY MOUTH DAILY  Yes Kiesha Graff MD   Cholecalciferol (VITAMIN D3) 50 MCG (2000 UT) TABS TAKE 1 TABLET BY MOUTH DAILY  Yes Kiesha Graff MD   Multiple Vitamin (MULTIVITAMIN ADULT PO) Take by mouth Yes Historical Provider, MD   ibuprofen (ADVIL;MOTRIN) 200 MG tablet Take 200 mg by mouth every 6 hours as needed for Pain Yes Historical Provider, MD   zinc 50 MG CAPS Take by mouth Yes Historical Provider, MD   vitamin B-12 (CYANOCOBALAMIN) 1000 MCG tablet Take 1 tablet by mouth daily Yes Kristi Zepeda MD       [facility] (Including outside providers/suppliers regularly involved in providing care):   Patient Care Team:  Kristi Zepeda MD as PCP - General  Kristi Zepeda MD as PCP - [facility] Empaneled Provider    Reviewed and updated this visit:  Allergies  Medphoebe Anand Cristhian, was evaluated through a synchronous (real-time) audio-video encounter. The patient (or guardian if applicable) is aware that this is a billable service, which includes applicable co-pays. This Virtual Visit was conducted with patient's (and/or legal guardian's) consent.  The visit was conducted pursuant to the emergency declaration under the [facility], [address] [place] [address] waiver authority and the [employer] Act. Patient identification was verified, and a caregiver was present when appropriate. The patient was located at home in a state where the provider was licensed to provide care. Eula Urias RN, 4/21/2022, performed the documented evaluation under the direct supervision of the attending physician.

## 2022-04-21 NOTE — PATIENT INSTRUCTIONS
Personalized Preventive Plan for Jadine Drivers - 4/21/2022  Medicare offers a range of preventive health benefits. Some of the tests and screenings are paid in full while other may be subject to a deductible, co-insurance, and/or copay. Some of these benefits include a comprehensive review of your medical history including lifestyle, illnesses that may run in your family, and various assessments and screenings as appropriate. After reviewing your medical record and screening and assessments performed today your provider may have ordered immunizations, labs, imaging, and/or referrals for you. A list of these orders (if applicable) as well as your Preventive Care list are included within your After Visit Summary for your review. Other Preventive Recommendations:    · A preventive eye exam performed by an eye specialist is recommended every 1-2 years to screen for glaucoma; cataracts, macular degeneration, and other eye disorders. · A preventive dental visit is recommended every 6 months. · Try to get at least 150 minutes of exercise per week or 10,000 steps per day on a pedometer . · Order or download the FREE \"Exercise & Physical Activity: Your Everyday Guide\" from [employer]. Call 3-964.282.7821 or search [employer] online. · You need 0468-2550 mg of calcium and 6219-2565 IU of vitamin D per day. It is possible to meet your calcium requirement with diet alone, but a vitamin D supplement is usually necessary to meet this goal.  · When exposed to the sun, use a sunscreen that protects against both UVA and UVB radiation with an SPF of 30 or greater. Reapply every 2 to 3 hours or after sweating, drying off with a towel, or swimming. · Always wear a seat belt when traveling in a car. Always wear a helmet when riding a bicycle or motorcycle.   Patient Education        Grief (Actual/Anticipated): Care Instructions  Overview     Grief is an emotional and physical reaction to a major loss. The words \"sorrow\" and \"heartache\" often are used to describe feelings of grief. You may feel grief when you lose a beloved person, pet, place, or thing. It is also natural to feel grief when you lose a valued way of life, such as a job, marriage, orgood health. You may begin to grieve before a loss occurs. You may grieve for a loved one who is sick and dying. Children and adults often feel the pain of loss before abig move or divorce. Grief is different for each person. There is no \"normal\" or \"expected\" periodof time for grieving. Grieving can cause problems such as headaches, loss of appetite, and trouble with thinking or sleeping. You may withdraw from friends and family and behave in ways that are unusual for you. Grief may cause you to question your beliefsand views about life. Grief is natural and does not require medical treatment. It may help to talk with people who have been through or are going through similar losses. You may also want to talk to a counselor about your feelings. Talking about your loss, sharing your cares and concerns, and getting support from others are importantparts of healthy grieving. Follow-up care is a key part of your treatment and safety. Be sure to make and go to all appointments, and call your doctor if you are having problems. It's also a good idea to know your test results and keep alist of the medicines you take. How can you care for yourself at home? Get enough sleep. Missing sleep may make it harder for you to cope with your grief. Eat healthy foods. Ask someone to join you for a meal if you don't like eating alone. Get some exercise every day. Even a walk can help you deal with your grief. Other exercises, such as yoga, may also help you manage stress. Stay involved in your life. Don't withdraw from the activities you enjoy. People you know at work, Baptism, clubs, or other groups can help you. Comfort yourself.  Familiar surroundings and special items, such as photos or a loved one's favorite shirt, may give you comfort. Think about joining a support group. When should you call for help? Call 911 anytime you think you may need emergency care. For example, call if:    You feel you cannot stop from hurting yourself or someone else. Watch closely for changes in your health, and be sure to contact your doctor if:    You think you may be depressed.     You do not get better as expected. Where can you learn more? Go to https://Viyetpepiceweb.Christini Technologies. org and sign in to your SmartHub account. Enter H249 in the Vixar box to learn more about \"Grief (Actual/Anticipated): Care Instructions. \"     If you do not have an account, please click on the \"Sign Up Now\" link. Current as of: October 18, 2021               Content Version: 13.2  © 4103-6450 [employer]. Care instructions adapted under license by [facility] ([facility]). If you have questions about a medical condition or this instruction, always ask your healthcare professional. [place] [address] any warranty or liability for your use of this information. Patient Education        Advance Care Planning: Care Instructions  Your Care Instructions     It can be hard to live with an illness that cannot be cured. But if your health is getting worse, you may want to make decisions about end-of-life care. Planning for the end of your life does not mean that you are giving up. It is a way to make sure that your wishes are met. Clearly stating your wishes can make it easier for your loved ones. Making plans while you are still able may alsoease your mind and make your final days less stressful and more meaningful. Follow-up care is a key part of your treatment and safety. Be sure to make and go to all appointments, and call your doctor if you are having problems.  It's also a good idea to know your test results and keep alist of the medicines you take. What can you do to plan for the end of life? You can bring these issues up with your doctor. You do not need to wait until your doctor starts the conversation. You might start with, \"What makes life worth living for me is. Rawleigh Billing Rawleigh Billing \" And then follow it with, \"I would not be willing to live with . Rawleigh Billing Rawleigh Billing Rawleigh Billing \" When you complete this sentence it helps your doctor understand your wishes. Talk openly and honestly with your doctor. This is the best way to understand the decisions you will need to make as your health changes. Know that you can always change your mind. Ask your doctor about commonly used life-support measures. These include tube feedings, breathing machines, and fluids given through a vein (IV). Understanding these treatments will help you decide whether you want them. You may choose to have these life-supporting treatments for a limited time. This allows a trial period to see whether they will help you. You may also decide that you want your doctor to take only certain measures to keep you alive. It may help to think about the big picture, like what makes life worth living for you or what your values and goals are. Talk to your doctor about how long you are likely to live. Your doctor may be able to give you an idea of what usually happens with your specific illness. Think about preparing papers that state your wishes. These papers are called advance directives. If you do this early and review them often, there will not be any confusion about what you want. You can change your instructions at any time. Which papers should you prepare? Advance directives are legal papers that tell doctors how you want to be cared for at the end of your life. You do not need a  to write these papers. Ask your doctor or your state health department for information on how to write your advance directives. They may have the forms for each of these types of papers.  Make sure your doctor has a copy of these on file, and give a copy to afamily member or close friend. Consider a do-not-resuscitate order (DNR). This order asks that no extra treatments be done if your heart stops or you stop breathing. Extra treatments may include cardiopulmonary resuscitation (CPR), electrical shock to restart your heart, or a machine to breathe for you. If you decide to have a DNR order, ask your doctor to explain and write it. Place the order in your home where everyone can easily see it. Consider a living will. A living will explains your wishes about life support and other treatments at the end of your life if you become unable to speak for yourself. Living tran tell doctors to use or not use treatments that would keep you alive. You must have one or two witnesses or a notary present when you sign this form. A living will may be called something else in your state. Consider a medical power of . This form allows you to name a person to make decisions about your care if you are not able to. Most people ask a close friend or family member. Talk to this person about the kinds of treatments you want and those that you do not want. Make sure this person understands your wishes. A medical power of  may be called something else in your state. These legal papers are simple to change. Tell your doctor what you want to change, and ask him or her to make a note in your medical file. Give yourfamily updated copies of the papers. Where can you learn more? Go to https://MartMobi Technologiespepiceweb.Everyday Health. org and sign in to your [employer] account. Enter P184 in the [facility] box to learn more about \"Advance Care Planning: Care Instructions. \"     If you do not have an account, please click on the \"Sign Up Now\" link. Current as of: October 18, 2021               Content Version: 13.2  © 9197-8789 Healthwise, Incorporated. Care instructions adapted under license by [facility] ([facility]).  If you have questions about a medical condition or this instruction, always ask your healthcare professional. [address] any warranty or liability for your use of this information. Patient Education        Learning About Vincent Hilliard  What is a living will? A living will, also called a declaration, is a legal form. It tells your family and your doctor your wishes when you can't speak for yourself. It's used by the health professionals who will treat you as you near the end of your life or ifyou get seriously hurt or ill. If you put your wishes in writing, your loved ones and others will know what kind of care you want. They won't need to guess. This can ease your mind and behelpful to others. And you can change or cancel your living will at any time. A living will is not the same as an estate or property will. An estate willexplains what you want to happen with your money and property after you die. How do you use it? Keep these facts in mind about how a living will is used. Your living will is used only if you can't speak or make decisions for yourself. Most often, one or more doctors must certify that you can't speak or decide for yourself before your living will takes effect. If you get better and can speak for yourself again, you can accept or refuse any treatment. It doesn't matter what you said in your living will. Some states may limit your right to refuse treatment in certain cases. For example, you may need to clearly state in your living will that you don't want artificial hydration and nutrition, such as being fed through a tube. Is a living will a legal document? A living will is a legal document. Each state has its own laws about livingwills. And a living will may be called something else in your state. Here are some things to know about living tran. You don't need an  to complete a living will. But legal advice can be helpful if your state's laws are unclear.  It can also help if your health history is complicated or your family can't agree on what should be in your living will. You can change your living will at any time. Some people find that their wishes about end-of-life care change as their health changes. If you make big changes to your living will, complete a new form. If you move to another state, make sure that your living will is legal in the state where you now live. In most cases, doctors will respect your wishes even if you have a form from a different state. You might use a universal form that has been approved by many states. This kind of form can sometimes be filled out and stored online. Your digital copy will then be available wherever you have a connection to the internet. The doctors and nurses who need to treat you can find it right away. Your state may offer an online registry. This is another place where you can store your living will online. It's a good idea to get your living will notarized. This means using a person called a [employer] to watch two people sign, or witness, your living will. What should you know when you create a living will? Here are some questions to ask yourself as you make your living will. Do you know enough about life support methods that might be used? If not, talk to your doctor so you know what might be done if you can't breathe on your own, your heart stops, or you can't swallow. What things would you still want to be able to do after you receive life-support methods? Would you want to be able to walk? To speak? To eat on your own? To live without the help of machines? Do you want certain Yarsani practices performed if you become very ill? If you have a choice, where do you want to be cared for? In your home? At a hospital or nursing home? If you have a choice at the end of your life, where would you prefer to die? At home? In a hospital or nursing home? Somewhere else? Would you prefer to be buried or cremated?   Do you want your organs to be donated after you die? What should you do with your living will? Make sure that your family members and your health care agent have copies of your living will (also called a declaration). Give your doctor a copy of your living will. Ask to have it kept as part of your medical record. If you have more than one doctor, make sure that each one has a copy. Put a copy of your living will where it can be easily found. For example, some people may put a copy on their refrigerator door. If you are using a digital copy, be sure your doctor, family members, and health care agent know how to find and access it. Where can you learn more? Go to https://VOICEPLATE.COMpepiceweb.Bokee. org and sign in to your Travel Likes.net account. Enter A772 in the Evim.net box to learn more about \"Learning About Living Jt Paz. \"     If you do not have an account, please click on the \"Sign Up Now\" link. Current as of: October 18, 2021               Content Version: 13.2  © 2006-2022 [employer] water systems. Care instructions adapted under license by [facility] ([facility]). If you have questions about a medical condition or this instruction, always ask your healthcare professional. [place] [address] any warranty or liability for your use of this information. Patient Education        Multiple Sclerosis (MS): Care Instructions  Your Care Instructions  Multiple sclerosis, also called MS, is a disease that can affect the brain, spinal cord, and nerves to the eyes. MS can cause problems with muscle control and strength, vision, balance, feeling, and thinking. Whatever your symptoms are, taking medicine correctly and following your doctor's advice for home carecan help you maintain your quality of life. Follow-up care is a key part of your treatment and safety. Be sure to make and go to all appointments, and call your doctor if you are having problems.  It's also a good idea to know your test results and keep alist of the medicines you take. How can you care for yourself at home? General care  Take your medicines exactly as prescribed. Call your doctor if you think you are having a problem with your medicine. Use a cane, walker, or scooter if your doctor suggests it. Keep doing your normal activities as much as you can. If you have problems urinating, press or tap your bladder area to help start urine flow. If you have trouble controlling your urine, plan your fluid intake and activities so that a toilet will be available when you need it. Spend time with family and friends. Join a support group for people with MS if you want extra help. Depression is common with this condition. Tell your doctor if you have trouble sleeping, are eating too much or are not hungry, or feel sad or tearful all the time. Depression can be treated with medicine and counseling. Diet and exercise  Eat a balanced diet. If you have problems swallowing, change how and what you eat:  Try thick drinks, such as milk shakes. They are easier to swallow than other fluids. Do not eat foods that crumble easily. These can cause choking. Use a  to prepare food. Soft foods need less chewing. Eat small meals often so that you do not get tired from eating larger meals. Get exercise on most days. Work with your doctor to set up a program of walking, swimming, or other exercise that you are able to do. A physical therapist can teach you exercises if you cannot walk but can move your limbs and trunk. Or you can do exercises to help with coordination and balance. You can help improve muscle stiffness by doing exercises while lying in certain positions. When should you call for help? Call your doctor now or seek immediate medical care if:    You have a change in symptoms.     You fall or have another injury.     You have symptoms of a urinary infection. For example: You have blood or pus in your urine.   You have pain in your back just below your rib cage. This is called flank pain. You have a fever, chills, or body aches. It hurts to urinate. You have groin or belly pain. Watch closely for changes in your health, and be sure to contact your doctor if:    You want more information about MS or medicines.     You have questions about alternative treatments. Do not use any other treatments without talking to your doctor first.   Where can you learn more? Go to https://Audio ShackpeSpark The Fire.Mortar Data. org and sign in to your FindIt account. Enter Q943 in the [place] box to learn more about \"Multiple Sclerosis (MS): Care Instructions. \"     If you do not have an account, please click on the \"Sign Up Now\" link. Current as of: December 13, 2021               Content Version: 13.2  © 5153-5829 Healthwise, Incorporated. Care instructions adapted under license by [facility] ([facility]). If you have questions about a medical condition or this instruction, always ask your healthcare professional. [place] [address] any warranty or liability for your use of this information.

## 2022-06-27 DIAGNOSIS — G47.09 OTHER INSOMNIA: ICD-10-CM

## 2022-06-28 RX ORDER — TRAZODONE HYDROCHLORIDE 100 MG/1
100 TABLET ORAL NIGHTLY
Qty: 30 TABLET | Refills: 1 | Status: SHIPPED | OUTPATIENT
Start: 2022-06-28 | End: 2022-08-24

## 2022-07-25 ENCOUNTER — TELEPHONE (OUTPATIENT)
Dept: INTERNAL MEDICINE | Age: 57
End: 2022-07-25

## 2022-07-25 NOTE — TELEPHONE ENCOUNTER
PC to patient she stated she doesn't want to schedule at the moment she will contact office in the fall for scheduling.

## 2022-08-23 DIAGNOSIS — G47.09 OTHER INSOMNIA: ICD-10-CM

## 2022-08-23 NOTE — TELEPHONE ENCOUNTER
Request for Trazodone    Pt due for 6 month f/u in October-- Lm to ontact office to schedule. Next Visit Date:  No future appointments.     Health Maintenance   Topic Date Due    COVID-19 Vaccine (1) Never done    Breast cancer screen  07/24/2021    Cervical cancer screen  08/30/2021    Shingles vaccine (1 of 2) 04/05/2023 (Originally 7/25/2016)    DTaP/Tdap/Td vaccine (1 - Tdap) 07/24/2024 (Originally 7/25/2014)    Flu vaccine (1) 09/01/2022    Lipids  04/05/2023    Depression Monitoring  04/21/2023    Annual Wellness Visit (AWV)  04/22/2023    Colorectal Cancer Screen  09/20/2023    Hepatitis C screen  Completed    HIV screen  Completed    Hepatitis A vaccine  Aged Out    Hepatitis B vaccine  Aged Out    Hib vaccine  Aged Out    Meningococcal (ACWY) vaccine  Aged Out    Pneumococcal 0-64 years Vaccine  Aged Out       Hemoglobin A1C (%)   Date Value   04/05/2022 5.3             ( goal A1C is < 7)   No results found for: LABMICR  LDL Cholesterol (mg/dL)   Date Value   04/05/2022 121       (goal LDL is <100)   AST (U/L)   Date Value   04/05/2022 21     ALT (U/L)   Date Value   04/05/2022 17     BUN (mg/dL)   Date Value   04/05/2022 16     BP Readings from Last 3 Encounters:   04/05/22 114/73   03/27/22 132/83   09/18/21 121/73          (goal 120/80)    All Future Testing planned in [facility]  Lab Frequency Next Occurrence   LINDA DIGITAL SCREEN W OR WO CAD BILATERAL Once 07/14/2022         Patient Active Problem List:     Mild depression ([address]     KLAUDIA (stress urinary incontinence, female)     Hyperlipidemia     ASCUS of cervix with negative high risk HPV

## 2022-08-24 RX ORDER — TRAZODONE HYDROCHLORIDE 100 MG/1
100 TABLET ORAL NIGHTLY
Qty: 30 TABLET | Refills: 1 | Status: SHIPPED | OUTPATIENT
Start: 2022-08-24 | End: 2022-11-01

## 2022-10-07 DIAGNOSIS — E78.00 PURE HYPERCHOLESTEROLEMIA: ICD-10-CM

## 2022-10-07 RX ORDER — PRAVASTATIN SODIUM 40 MG
40 TABLET ORAL DAILY
Qty: 30 TABLET | Refills: 5 | Status: SHIPPED | OUTPATIENT
Start: 2022-10-07

## 2022-10-07 NOTE — TELEPHONE ENCOUNTER
E-scribe request for medication pravastatin . Pt is on wait list till October, unable to schedule at this time. Health Maintenance   Topic Date Due    COVID-19 Vaccine (1) Never done    Breast cancer screen  07/24/2021    Cervical cancer screen  08/30/2021    Flu vaccine (1) Never done    Shingles vaccine (1 of 2) 04/05/2023 (Originally 7/25/2016)    DTaP/Tdap/Td vaccine (1 - Tdap) 07/24/2024 (Originally 7/25/2014)    Lipids  04/05/2023    Depression Monitoring  04/21/2023    Annual Wellness Visit (AWV)  04/22/2023    Colorectal Cancer Screen  09/20/2023    Hepatitis C screen  Completed    HIV screen  Completed    Hepatitis A vaccine  Aged Out    Hepatitis B vaccine  Aged Out    Hib vaccine  Aged Out    Meningococcal (ACWY) vaccine  Aged Out    Pneumococcal 0-64 years Vaccine  Aged Out             (applicable per patient's age: Cancer Screenings, Depression Screening, Fall Risk Screening, Immunizations)    Hemoglobin A1C (%)   Date Value   04/05/2022 5.3     LDL Cholesterol (mg/dL)   Date Value   04/05/2022 121     AST (U/L)   Date Value   04/05/2022 21     ALT (U/L)   Date Value   04/05/2022 17     BUN (mg/dL)   Date Value   04/05/2022 16      (goal A1C is < 7)   (goal LDL is <100) need 30-50% reduction from baseline     BP Readings from Last 3 Encounters:   04/05/22 114/73   03/27/22 132/83   09/18/21 121/73    (goal /80)      All Future Testing planned in [facility]:  Lab Frequency Next Occurrence   LINDA DIGITAL SCREEN W OR WO CAD BILATERAL Once 07/14/2022       Next Visit Date:  No future appointments.          Patient Active Problem List:     Mild depression     KLAUDIA (stress urinary incontinence, female)     Hyperlipidemia     ASCUS of cervix with negative high risk HPV

## 2022-11-01 DIAGNOSIS — G47.09 OTHER INSOMNIA: ICD-10-CM

## 2022-11-01 RX ORDER — TRAZODONE HYDROCHLORIDE 100 MG/1
100 TABLET ORAL NIGHTLY
Qty: 30 TABLET | Refills: 1 | Status: SHIPPED | OUTPATIENT
Start: 2022-11-01

## 2022-11-01 NOTE — TELEPHONE ENCOUNTER
E-scribe request for Trazodone . Please review and e-scribe if applicable.        Next Visit Date:  Future Appointments   Date Time Provider Artis Graciai   11/9/2022  3:20 PM Chelsey Flowers  [address] Maintenance   Topic Date Due    COVID-19 Vaccine (1) Never done    Breast cancer screen  07/24/2021    Cervical cancer screen  08/30/2021    Flu vaccine (1) Never done    Shingles vaccine (1 of 2) 04/05/2023 (Originally 7/25/2016)    DTaP/Tdap/Td vaccine (1 - Tdap) 07/24/2024 (Originally 7/25/2014)    Lipids  04/05/2023    Depression Monitoring  04/21/2023    Annual Wellness Visit (AWV)  04/22/2023    Colorectal Cancer Screen  09/20/2023    Hepatitis C screen  Completed    HIV screen  Completed    Hepatitis A vaccine  Aged Out    Hib vaccine  Aged Out    Meningococcal (ACWY) vaccine  Aged Out    Pneumococcal 0-64 years Vaccine  Aged Out               (applicable per patient's age: Cancer Screenings, Depression Screening, Fall Risk Screening, Immunizations)    Hemoglobin A1C (%)   Date Value   04/05/2022 5.3     LDL Cholesterol (mg/dL)   Date Value   04/05/2022 121     AST (U/L)   Date Value   04/05/2022 21     ALT (U/L)   Date Value   04/05/2022 17     BUN (mg/dL)   Date Value   04/05/2022 16      (goal A1C is < 7)   (goal LDL is <100) need 30-50% reduction from baseline     BP Readings from Last 3 Encounters:   04/05/22 114/73   03/27/22 132/83   09/18/21 121/73    (goal /80)      All Future Testing planned in [facility]:  Lab Frequency Next Occurrence   LINDA DIGITAL SCREEN W OR WO CAD BILATERAL Once 07/14/2022            Patient Active Problem List:     Mild depression     KLAUDIA (stress urinary incontinence, female)     Hyperlipidemia     ASCUS of cervix with negative high risk HPV

## 2022-11-09 ENCOUNTER — OFFICE VISIT (OUTPATIENT)
Dept: INTERNAL MEDICINE | Age: 57
End: 2022-11-09
Payer: COMMERCIAL

## 2022-11-09 VITALS
SYSTOLIC BLOOD PRESSURE: 104 MMHG | TEMPERATURE: 98.1 F | BODY MASS INDEX: 32.23 KG/M2 | DIASTOLIC BLOOD PRESSURE: 65 MMHG | WEIGHT: 212 LBS | HEART RATE: 63 BPM

## 2022-11-09 DIAGNOSIS — M54.2 CHRONIC NECK PAIN: ICD-10-CM

## 2022-11-09 DIAGNOSIS — F32.A MILD DEPRESSION: ICD-10-CM

## 2022-11-09 DIAGNOSIS — G47.09 OTHER INSOMNIA: ICD-10-CM

## 2022-11-09 DIAGNOSIS — G89.29 CHRONIC NECK PAIN: ICD-10-CM

## 2022-11-09 DIAGNOSIS — F41.9 ANXIETY: ICD-10-CM

## 2022-11-09 DIAGNOSIS — E78.00 PURE HYPERCHOLESTEROLEMIA: Primary | ICD-10-CM

## 2022-11-09 PROCEDURE — 99214 OFFICE O/P EST MOD 30 MIN: CPT | Performed by: INTERNAL MEDICINE

## 2022-11-09 PROCEDURE — 99211 OFF/OP EST MAY X REQ PHY/QHP: CPT | Performed by: INTERNAL MEDICINE

## 2022-11-09 RX ORDER — ACETAMINOPHEN 500 MG
500 TABLET ORAL EVERY 6 HOURS PRN
COMMUNITY

## 2022-11-09 RX ORDER — ESCITALOPRAM OXALATE 20 MG/1
TABLET ORAL
Qty: 30 TABLET | Refills: 5 | Status: SHIPPED | OUTPATIENT
Start: 2022-11-09

## 2022-11-09 ASSESSMENT — ENCOUNTER SYMPTOMS
WHEEZING: 0
SHORTNESS OF BREATH: 0
RHINORRHEA: 0
COUGH: 0
PHOTOPHOBIA: 0

## 2022-11-09 NOTE — PATIENT INSTRUCTIONS
-Pt due for 6 month f/u in May-- pt to call in April to set up an appt--reminder in [facility] to contact patient as well--AVS given to patient     [facility]     -Your doctor has ordered an Mammogram for you, please contact our scheduling department at [phone] to set up an appointment    -CIERA Newman

## 2022-11-09 NOTE — PROGRESS NOTES
[facility]    Progress Note    Date of patient's visit: 11/9/2022    Patient's Name:  Vernell Ramos    YOB: 1965            Patient Care Team:  Cr Sharp MD as PCP - Soledad Rodriguez MD as PCP - [facility] Empaneled Provider    REASON FOR VISIT: Routine outpatient follow     Chief Complaint   Patient presents with    Health Maintenance     Flu vaccine declined, [facility]     Neck Pain     Pt c/o having ongoing neck and shoulder pain     Cholesterol Problem         HISTORY OF PRESENT ILLNESS:    History was obtained from the patient. Vernell Ramos is a 65 y.o. is here for follow-up. Overall she is doing well except she still having some neck pain on the left side that goes to above her scapular area into her left shoulder. No numbness or tingling in her hands. She does not want to do any physical therapy. She denies any headaches. She has not had any migraine attacks. She says she is got a started water aerobics program.  She does not want me to send her to physical therapy at this point. She refuses all vaccinations including flu vaccine. She has not done her mammogram yet but plans on completing it. She did go to [facility] and recently had a Pap smear done  She is up-to-date with [facility]. Denies any blood in her stools. Is no longer seeing a psychiatrist.  She says she her depression anxiety is controlled.   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    KLAUDIA (stress urinary incontinence, female) 8/19/2014       Past Surgical History:   Procedure Laterality Date    ANUS SURGERY      cyst I and D    OTHER SURGICAL HISTORY cervical biopsy    TONSILLECTOMY           ALLERGIES      Allergies   Allergen Reactions    Dye [Iodides] Rash     Skin burned, raised rash on body    Bactrim [Sulfamethoxazole-Trimethoprim] Other (See Comments)     Blood in stools       MEDICATIONS:      Current Outpatient Medications on File Prior to Visit   Medication Sig Dispense Refill    acetaminophen (TYLENOL) 500 MG tablet Take 500 mg by mouth every 6 hours as needed for Pain      traZODone (DESYREL) 100 MG tablet take 1 tablet by mouth nightly 30 tablet 1    pravastatin (PRAVACHOL) 40 MG tablet Take 1 tablet by mouth daily 30 tablet 5    escitalopram (LEXAPRO) 20 MG tablet TAKE 1 TAB BY MOUTH ONCE A DAY 30 tablet 5    folic acid (FOLVITE) 1 MG tablet TAKE 1 TABLET BY MOUTH DAILY  30 tablet 4    Cholecalciferol (VITAMIN D3) 50 MCG (2000 UT) TABS TAKE 1 TABLET BY MOUTH DAILY  30 tablet 4    Multiple Vitamin (MULTIVITAMIN ADULT PO) Take by mouth      ibuprofen (ADVIL;MOTRIN) 200 MG tablet Take 200 mg by mouth every 6 hours as needed for Pain      vitamin B-12 (CYANOCOBALAMIN) 1000 MCG tablet Take 1 tablet by mouth daily 30 tablet 5     No current facility-administered medications on file prior to visit. HISTORY    Reviewed and no change from previous record. Janie Sims  reports that she has never smoked. She has never used smokeless tobacco.    FAMILY HISTORY:    Reviewed and No change from previous visit    HEALTH MAINTENANCE DUE:      Health Maintenance Due   Topic Date Due    COVID-19 Vaccine (1) Never done    Breast cancer screen  07/24/2021    Cervical cancer screen  08/30/2021    Flu vaccine (1) Never done       REVIEW OF SYSTEMS:    12 point review of symptoms completed and found to be normal except noted in the HPI    Review of Systems   Constitutional:  Negative for fatigue and unexpected weight change. HENT:  Positive for congestion. Negative for postnasal drip and rhinorrhea. Eyes:  Negative for photophobia and visual disturbance. Respiratory:  Negative for cough, shortness of breath and wheezing. Cardiovascular:  Negative for chest pain, palpitations and leg swelling. Musculoskeletal:  Positive for myalgias (upper left back and neck) and neck pain. Allergic/Immunologic: Positive for environmental allergies. Negative for immunocompromised state. Neurological:  Negative for dizziness, syncope, weakness and numbness. Hematological:  Negative for adenopathy. Does not bruise/bleed easily. Psychiatric/Behavioral:  Negative for dysphoric mood and sleep disturbance. The patient is not nervous/anxious. PHYSICAL EXAM:     Vitals:    11/09/22 1321   BP: 104/65   Site: Right Upper Arm   Position: Sitting   Cuff Size: Large Adult   Pulse: 63   Temp: 98.1 °F (36.7 °C)   TempSrc: Infrared   Weight: 212 lb (96.2 kg)     Body mass index is 32.23 kg/m². BP Readings from Last 3 Encounters:   11/09/22 104/65   04/05/22 114/73   03/27/22 132/83        Wt Readings from Last 3 Encounters:   11/09/22 212 lb (96.2 kg)   04/05/22 221 lb (100.2 kg)   03/27/22 215 lb (97.5 kg)       Physical Exam  Vitals and nursing note reviewed. Constitutional:       Appearance: Normal appearance. Eyes:      Extraocular Movements: Extraocular movements intact. Conjunctiva/sclera: Conjunctivae normal.      Pupils: Pupils are equal, round, and reactive to light. Cardiovascular:      Rate and Rhythm: Normal rate and regular rhythm. Pulmonary:      Effort: Pulmonary effort is normal.      Breath sounds: Normal breath sounds. No wheezing. Musculoskeletal:      Right lower leg: No edema. Left lower leg: No edema. Neurological:      General: No focal deficit present. Mental Status: She is alert and oriented to person, place, and time. Motor: No weakness.          LABORATORY FINDINGS:    CBC:  Lab Results   Component Value Date/Time    WBC 6.9 03/27/2022 05:09 PM    HGB 11.2 03/27/2022 05:09 PM     03/27/2022 05:09 PM     05/05/2012 01:55 AM     BMP:    Lab Results   Component Value Date/Time     04/05/2022 09:25 AM    K 4.0 04/05/2022 09:25 AM     04/05/2022 09:25 AM    CO2 25 04/05/2022 09:25 AM    BUN 16 04/05/2022 09:25 AM    CREATININE 0.93 04/05/2022 09:25 AM    GLUCOSE 86 04/05/2022 09:25 AM    GLUCOSE 76 05/05/2012 01:55 AM     HEMOGLOBIN A1C:   Lab Results   Component Value Date/Time    LABA1C 5.3 04/05/2022 09:25 AM     MICROALBUMIN URINE: No results found for: MICROALBUR  FASTING LIPID PANEL:  Lab Results   Component Value Date    CHOL 230 (H) 04/05/2022    HDL 86 04/05/2022    TRIG 114 04/05/2022     Lab Results   Component Value Date    LDLCHOLESTEROL 121 04/05/2022       LIVER PROFILE:  Lab Results   Component Value Date/Time    ALT 17 04/05/2022 09:25 AM    AST 21 04/05/2022 09:25 AM    PROT 7.4 04/05/2022 09:25 AM    BILITOT 0.24 04/05/2022 09:25 AM    BILIDIR <0.08 12/14/2016 02:09 PM    LABALBU 4.2 04/05/2022 09:25 AM    LABALBU 4.6 05/05/2012 01:55 AM      THYROID FUNCTION:   Lab Results   Component Value Date/Time    TSH 1.61 03/19/2021 01:42 PM      URINEANALYSIS: No results found for: LABURIN  ASSESSMENT AND PLAN:    1. Pure hypercholesterolemia  On statin  Labs reviewed  Advised diet changes     2. Chronic neck pain  Refuses PT  Home Exercises provided. 3. Anxiety  Stable     - escitalopram (LEXAPRO) 20 MG tablet; TAKE 1 TAB BY MOUTH ONCE A DAY  Dispense: 30 tablet; Refill: 5    4. Other insomnia  On Trazodone     - escitalopram (LEXAPRO) 20 MG tablet; TAKE 1 TAB BY MOUTH ONCE A DAY  Dispense: 30 tablet; Refill: 5    5. Mild depression    - escitalopram (LEXAPRO) 20 MG tablet; TAKE 1 TAB BY MOUTH ONCE A DAY  Dispense: 30 tablet; Refill: 5        FOLLOW UP AND INSTRUCTIONS:   Return in about 6 months (around 5/9/2023). Rodolfo Dueñas received counseling on the following healthy behaviors: nutrition, exercise, and medication adherence    Reviewed prior labs and health maintenance.       Discussed use, benefit, and side effects of prescribed medications. Barriers to medication compliance addressed. All patient questions answered. Pt voiced understanding.        Flo Orellanaand  Attending Physician, [address] [place] [address], Internal Medicine Residency Program  [address] [place] [address]  11/9/2022, 1:37 PM

## 2023-01-01 DIAGNOSIS — G47.09 OTHER INSOMNIA: ICD-10-CM

## 2023-01-04 RX ORDER — TRAZODONE HYDROCHLORIDE 100 MG/1
100 TABLET ORAL NIGHTLY
Qty: 30 TABLET | Refills: 1 | Status: SHIPPED | OUTPATIENT
Start: 2023-01-04

## 2023-01-04 NOTE — TELEPHONE ENCOUNTER
Request for Trazodone.       Next Visit Date:  Future Appointments   Date Time Provider Artis Harding   1/23/2023  4:30  [facility] DIGITAL RM STNII PETERSONS [facility] [address] [facility]       Health Maintenance   Topic Date Due    COVID-19 Vaccine (1) Never done    Breast cancer screen  07/24/2021    Cervical cancer screen  08/30/2021    Shingles vaccine (1 of 2) 04/05/2023 (Originally 7/25/2016)    Flu vaccine (1) 06/30/2023 (Originally 8/1/2022)    DTaP/Tdap/Td vaccine (1 - Tdap) 07/24/2024 (Originally 7/25/2014)    Lipids  04/05/2023    Depression Monitoring  04/21/2023    Annual Wellness Visit (AWV)  04/22/2023    Colorectal Cancer Screen  09/20/2023    Hepatitis C screen  Completed    HIV screen  Completed    Hepatitis A vaccine  Aged Out    Hib vaccine  Aged Out    Meningococcal (ACWY) vaccine  Aged Out    Pneumococcal 0-64 years Vaccine  Aged Out       Hemoglobin A1C (%)   Date Value   04/05/2022 5.3             ( goal A1C is < 7)   No results found for: LABMICR  LDL Cholesterol (mg/dL)   Date Value   04/05/2022 121       (goal LDL is <100)   AST (U/L)   Date Value   04/05/2022 21     ALT (U/L)   Date Value   04/05/2022 17     BUN (mg/dL)   Date Value   04/05/2022 16     BP Readings from Last 3 Encounters:   11/09/22 104/65   04/05/22 114/73   03/27/22 132/83          (goal 120/80)    All Future Testing planned in [facility]  Lab Frequency Next Occurrence   LINDA DIGITAL SCREEN W OR WO CAD BILATERAL Once 07/14/2022         Patient Active Problem List:     Mild depression     KLAUDIA (stress urinary incontinence, female)     Hyperlipidemia     ASCUS of cervix with negative high risk HPV

## 2023-01-23 ENCOUNTER — HOSPITAL ENCOUNTER (OUTPATIENT)
Dept: WOMENS IMAGING | Age: 58
Discharge: HOME | End: 2023-01-25
Payer: COMMERCIAL

## 2023-01-23 DIAGNOSIS — Z12.31 SCREENING MAMMOGRAM FOR BREAST CANCER: ICD-10-CM

## 2023-01-23 PROCEDURE — 77067 SCR MAMMO BI INCL CAD: CPT

## 2023-01-24 ENCOUNTER — TELEPHONE (OUTPATIENT)
Dept: INTERNAL MEDICINE | Age: 58
End: 2023-01-24

## 2023-01-27 DIAGNOSIS — R92.8 ABNORMAL MAMMOGRAM OF LEFT BREAST: Primary | ICD-10-CM

## 2023-02-03 ENCOUNTER — TELEPHONE (OUTPATIENT)
Dept: INTERNAL MEDICINE | Age: 58
End: 2023-02-03

## 2023-02-15 ENCOUNTER — HOSPITAL ENCOUNTER (OUTPATIENT)
Dept: WOMENS IMAGING | Age: 58
Discharge: HOME | End: 2023-02-17
Payer: COMMERCIAL

## 2023-02-15 DIAGNOSIS — R92.8 ABNORMAL MAMMOGRAM OF LEFT BREAST: ICD-10-CM

## 2023-02-15 DIAGNOSIS — N64.89 BREAST ASYMMETRY: ICD-10-CM

## 2023-02-15 PROCEDURE — 76642 ULTRASOUND BREAST LIMITED: CPT

## 2023-02-15 PROCEDURE — 77065 DX MAMMO INCL CAD UNI: CPT

## 2023-02-27 ENCOUNTER — TELEPHONE (OUTPATIENT)
Dept: INTERNAL MEDICINE | Age: 58
End: 2023-02-27

## 2023-02-27 NOTE — TELEPHONE ENCOUNTER
Pt is calling the office to let you know that she is wanting/ready to schedule her breast procedure, from imaging she did on 2/16/23, pt states she want the surgery done on a weekend.  Thanks, please advise

## 2023-03-16 ENCOUNTER — INITIAL CONSULT (OUTPATIENT)
Dept: ONCOLOGY | Age: 58
End: 2023-03-16

## 2023-03-16 DIAGNOSIS — Z91.89 AT HIGH RISK FOR BREAST CANCER: Primary | ICD-10-CM

## 2023-03-16 DIAGNOSIS — N64.89 RADIAL SCAR OF BREAST: ICD-10-CM

## 2023-03-16 NOTE — PROGRESS NOTES
[facility] Program   Hereditary Cancer Risk Assessment     Name: Melony Ta   YOB: 1965   Date of Consultation: 3/16/23     Ms. Cadence Mccain was seen at the [place] [address] for genetic counseling on 3/16/23. Ms. Cadence Mccain was referred by Shalonda Payne MD due to her family history of cancers. PERSONAL HISTORY   Ms. Cadence Mccain is a 65 y.o. female with no personal history of cancer. She reports:     Menarche at age: 14y  First child at age: NA, nulliparous   Menopause at age: NA, pre menopausal   Hysterectomy: Never   Oophorectomy: Never   Last mammogram: 2/15/2023    Patient underwent diagnostic mammogram and left ultrasound on 2/15/2023. Her lifetime risk for breast cancer was calculated at her prior screening mammogram in January with TC = 20.2%. In , she underwent left stereotactic guided biopsy at [facility] ([facility]) which revealed proliferative fibrocystic changes with focal micro calcifications. In the microscopic description, the pathologist indicates a component could be a portion of a radial sclerosing lesion. Because the radial sclerosing lesion is considered a high risk lesion surgical consultation for excision was recommended; however, she did not complete this at the time. She is requesting a referral to a surgeon at [facility] at this time. FAMILY HISTORY  Ms. Cadence Mccain has no biological children. She has three full brothers, no cancer history. Ms. Cadence Mccain reports that her maternal grandmother  of ovarian cancer at age 61. Her mother had some sort of metastatic cancer; it is unclear what the origin is. It is reported that her paternal aunt had breast cancer; however, the details of this are unclear. Ms. Cadence Mccain reports unknown ancestry and denies any known Ashkenazi Protestant heritage.      RISK ASSESSMENT   We discussed that approximately 5-10% of cancers are due to a hereditary gene mutation which causes an increased risk for certain cancers. Hereditary cancers are typically diagnosed at younger ages (under age 46y) and occur in multiple generations of a family. Multiple individuals with the same type of cancer (example: breast or colorectal) or uncommon cancers (example: ovarian, pancreatic, male breast cancer) are also features of hereditary cancers. In summary, Ms. Jas Brown meets the [address] [place] [address] (NCCN) guidelines for genetic testing of the BRCA1/2 genes due to having a paternal second degree relative with ovarian cancer. The NCCN guidelines also recognize that an individual's personal and/or family history may be explained by more than one inherited cancer syndrome. Thus, a multi-gene panel may be more efficient, more cost effecting, and increases the yield of detecting a hereditary mutation which would impact medical management. Given her personal and/or family history, we recommend testing for the following genes at minimum: RAD51C/D, BRIP1, Serna syndrome genes. DISCUSSION  We discussed that the BRCA1/2 genes are the most common genes associated with hereditary breast, ovarian, prostate and pancreatic cancer. We also discussed that genetic testing is available for multiple other genes related to hereditary cancer. Some of these genes are known to carry a significant increased risk for several cancers including colon, breast, uterine, ovarian, stomach, and pancreatic cancer, while some of these genes are believed to have a moderate increased risk for breast and other cancers. We discussed the possibility of finding a mutation in genes with limited information to guide medical management, as well we as the possibility of identifying variants of uncertain significance (VUS). We discussed the risks, benefits, and limitations of genetic testing. Possible test results were discussed as well as potential screening and prevention strategies.  Specifically, we discussed:    1) Increased breast cancer surveillance by mammogram and breast MRI as well as the option of prophylactic mastectomy  2) Possible recommendations for prophylactic oophorectomy for results which suggest an increased risk for ovarian cancer  3) Possible recommendations for prophylactic hysterectomy for results which suggest an increased risk for endometrial cancer  4) Increased colon cancer surveillance by colonoscopy screening every 1-2 years beginning by age 18-19y    Lastly, we discussed that the results of Ms. Rayne Cortés genetic testing may be beneficial in defining her risk for cancer as well as for her family members. SUMMARY & PLAN  1) Ms. Sruthi Mccrary meets the NCCN criteria for genetic testing based on her maternal family history of ovarian cancer and possible paternal family history of breast cancer. 2) Genetic testing via a multi-gene panel was recommended and offered to Ms. Sruthi Mccrary. 3) Ms. Sruthi Mccrary elected to proceed with the CancerNext Expanded + RNA [facility] Hereditary Cancer Gene Panel. 4) Ms. Sruthi Mccrary is aware that she will receive a notification from the laboratory [facility]) if the out of pocket cost for testing exceeds $250 (based on individual insurance plan) and the alternative option to proceed with the self pay price of $250.     5) Informed consent was obtained and a blood sample was sent to [facility]. We will call Ms. Sruthi Mccrary with results as soon as they are available. A follow up appointment may be recommended. A summary letter with results and final medical management recommendations will be sent once available. 6) At the patient's request, a referral to Dr. Clair Estrada was provided for possible surgical consultation of the previous benign high risk breast lesion discovered in her 2019 breast biopsy. She is aware that Dr. Trudy Hammond office will call her tomorrow to set up an appointment.      A total of 30 minutes were spent face to face with Ms. Yasmeen Mcgill and 50% of the time was spent educating and counseling. The [address] [facility] would be glad to offer our assistance should you have any questions or concerns about this information. Please feel free to contact us at [phone]. Miguel Green.  Yanci Bai MS, [facility]   Licensed Genetic Counselor

## 2023-03-20 ENCOUNTER — TELEPHONE (OUTPATIENT)
Dept: INTERNAL MEDICINE | Age: 58
End: 2023-03-20

## 2023-03-20 NOTE — TELEPHONE ENCOUNTER
----- Message from Collette Balk sent at 3/10/2023 10:48 AM EST -----  Subject: Message to Provider    QUESTIONS  Information for Provider? Patient had mammograms' done and they indicted   something there but, non-cancerous. She wanted to have surgery for this. Would like this to be done at [facility] on a weekend preferably. Wanted   to know if she needed an appointment to discuss also or how to go about   this. Says she called before and no one has answered. Wanted to let    know that she has another high risk area in her lower region that she has   to have a surgery for. Please call patient to advise.   ---------------------------------------------------------------------------  --------------  Leyla MORRISSEY  [phone]; OK to leave message on voicemail,OK to respond with electronic   message via [employer] portal (only for patients who have registered [employer]   account)  ---------------------------------------------------------------------------  --------------  SCRIPT ANSWERS  Relationship to Patient?  Self

## 2023-03-27 ENCOUNTER — OFFICE VISIT (OUTPATIENT)
Dept: SURGERY | Age: 58
End: 2023-03-27
Payer: COMMERCIAL

## 2023-03-27 VITALS
DIASTOLIC BLOOD PRESSURE: 60 MMHG | WEIGHT: 220 LBS | BODY MASS INDEX: 33.45 KG/M2 | TEMPERATURE: 96.8 F | HEART RATE: 65 BPM | SYSTOLIC BLOOD PRESSURE: 93 MMHG

## 2023-03-27 DIAGNOSIS — N64.89 RADIAL SCAR OF LEFT BREAST: Primary | ICD-10-CM

## 2023-03-27 PROCEDURE — 99204 OFFICE O/P NEW MOD 45 MIN: CPT | Performed by: STUDENT IN AN ORGANIZED HEALTH CARE EDUCATION/TRAINING PROGRAM

## 2023-03-27 NOTE — PROGRESS NOTES
no evidence of atypia or   malignancy. ASSESSMENT:  Core needle biopsy of left breast with a radial sclerosing lesion        PLAN:  Patient underwent genetic testing and is concern for high risk breast cancer. She has a previously biopsied left breast asymmetry in 2019 which showed a radial sclerosing lesion concerning for radial scar. No previous mammogram to compare to, and most recent mammogram showed continued asymmetry with a BI-RADS 3. I had a long discussion with the patient and the office. I discussed the high risk lesion of radial scar, and how studies have shown that roughly 9% are associated with invasive ductal cancer. I discussed the options of conservative management with repeat imaging in 6 months versus excisional biopsy of the previous biopsied site and radial scar. We discussed the risk versus benefits of both approaches. The patient verbalized understanding and requested to proceed with an excisional biopsy due to her anxiety and wanting the final pathology. The plan will be to proceed with placement of RF marker by radiology 5 to 7 days prior to surgery.   Followed by excisional biopsy of the left breast.        Nicole Elaine, DO  3/27/2023

## 2023-04-03 NOTE — H&P
04/05/2022                                           EKG REVIEW               PROVISIONAL DIAGNOSES / SURGERY:      EXCISION BIOPSY BREAST W/LOCALIZER    Radial scar of left breast       Patient Active Problem List    Diagnosis Date Noted    KLAUDIA (stress urinary incontinence, female) 08/19/2014    Hyperlipidemia 08/19/2014    Mild depression 07/29/2014    ASCUS of cervix with negative high risk HPV 09/15/2016       CLEARANCE:     ART Rg, RANDELL - CNP on 4/5/2023 at 11:22 AM    Total time spent on encounter- PAT provider minutes: 31-40 minutes    Note marked for cosign by provider

## 2023-04-05 ENCOUNTER — HOSPITAL ENCOUNTER (OUTPATIENT)
Dept: PREADMISSION TESTING | Age: 58
Discharge: HOME | End: 2023-04-05
Payer: COMMERCIAL

## 2023-04-05 VITALS
TEMPERATURE: 98.1 F | HEIGHT: 68 IN | HEART RATE: 76 BPM | BODY MASS INDEX: 33.34 KG/M2 | OXYGEN SATURATION: 100 % | RESPIRATION RATE: 16 BRPM | SYSTOLIC BLOOD PRESSURE: 104 MMHG | WEIGHT: 220 LBS | DIASTOLIC BLOOD PRESSURE: 63 MMHG

## 2023-04-05 LAB
ABSOLUTE EOS #: 0.4 K/UL (ref 0–0.4)
ABSOLUTE LYMPH #: 1.5 K/UL (ref 1–4.8)
ABSOLUTE MONO #: 0.4 K/UL (ref 0.1–1.3)
ANION GAP SERPL CALCULATED.3IONS-SCNC: 8 MMOL/L (ref 9–17)
BASOPHILS # BLD: 1 % (ref 0–2)
BASOPHILS ABSOLUTE: 0.1 K/UL (ref 0–0.2)
BUN SERPL-MCNC: 11 MG/DL (ref 6–20)
CALCIUM SERPL-MCNC: 8.6 MG/DL (ref 8.6–10.4)
CHLORIDE SERPL-SCNC: 102 MMOL/L (ref 98–107)
CO2 SERPL-SCNC: 28 MMOL/L (ref 20–31)
CREAT SERPL-MCNC: 0.84 MG/DL (ref 0.5–0.9)
EOSINOPHILS RELATIVE PERCENT: 5 % (ref 0–4)
GFR SERPL CREATININE-BSD FRML MDRD: >60 ML/MIN/1.73M2
GLUCOSE SERPL-MCNC: 103 MG/DL (ref 70–99)
HCT VFR BLD AUTO: 34.5 % (ref 36–46)
HGB BLD-MCNC: 11.5 G/DL (ref 12–16)
LYMPHOCYTES # BLD: 22 % (ref 24–44)
MCH RBC QN AUTO: 30.3 PG (ref 26–34)
MCHC RBC AUTO-ENTMCNC: 33.5 G/DL (ref 31–37)
MCV RBC AUTO: 90.7 FL (ref 80–100)
MONOCYTES # BLD: 6 % (ref 1–7)
PDW BLD-RTO: 13.6 % (ref 11.5–14.9)
PLATELET # BLD AUTO: 262 K/UL (ref 150–450)
PMV BLD AUTO: 8.5 FL (ref 6–12)
POTASSIUM SERPL-SCNC: 4.3 MMOL/L (ref 3.7–5.3)
RBC # BLD: 3.8 M/UL (ref 4–5.2)
SEG NEUTROPHILS: 66 % (ref 36–66)
SEGMENTED NEUTROPHILS ABSOLUTE COUNT: 4.7 K/UL (ref 1.3–9.1)
SODIUM SERPL-SCNC: 138 MMOL/L (ref 135–144)
WBC # BLD AUTO: 7 K/UL (ref 3.5–11)

## 2023-04-05 PROCEDURE — 85025 COMPLETE CBC W/AUTO DIFF WBC: CPT

## 2023-04-05 PROCEDURE — APPSS45 APP SPLIT SHARED TIME 31-45 MINUTES: Performed by: NURSE PRACTITIONER

## 2023-04-05 PROCEDURE — 36415 COLL VENOUS BLD VENIPUNCTURE: CPT

## 2023-04-05 PROCEDURE — 80048 BASIC METABOLIC PNL TOTAL CA: CPT

## 2023-04-05 ASSESSMENT — PAIN DESCRIPTION - DESCRIPTORS: DESCRIPTORS: ACHING

## 2023-04-05 ASSESSMENT — PAIN DESCRIPTION - LOCATION: LOCATION: GENERALIZED

## 2023-04-05 ASSESSMENT — PAIN SCALES - GENERAL: PAINLEVEL_OUTOF10: 6

## 2023-04-05 ASSESSMENT — PAIN DESCRIPTION - PAIN TYPE: TYPE: CHRONIC PAIN

## 2023-04-05 NOTE — DISCHARGE INSTRUCTIONS
Pre-op Instructions For Out-Patient Surgery    Medication Instructions:  Please stop herbs and any supplements now (includes vitamins and minerals). Please contact your surgeon and prescribing physician for pre-op instructions for any blood thinners. Ibuprofen as directed. If you have inhalers/aerosol treatments at home, please use them the morning of your surgery and bring the inhalers with you to the hospital.    Please take the following medications the morning of your surgery with a sip of water:    none    Surgery Instructions:  After midnight before surgery:  Do not eat or drink anything, including water, mints, gum, and hard candy. You may brush your teeth without swallowing. No smoking, chewing tobacco, or street drugs. Please shower or bathe before surgery. If you were given Surgical Scrub Chlorhexidine Gluconate Liquid (CHG), please shower the night before and the morning of your surgery following the detailed instructions you received during your pre-admission visit. Please do not wear any cologne, lotion, powder, deodorant, jewelry, piercings, perfume, makeup, nail polish, hair accessories, or hair spray on the day of surgery. Wear loose comfortable clothing. Leave your valuables at home. Bring a storage case for any glasses/contacts. An adult who is responsible for you MUST drive you home and should be with you for the first 24 hours after surgery. If having out-patient knee and foot surgeries, please arrange for planned crutches, walker, or wheelchair before arriving to the hospital.    The Day of Surgery:  Arrive at [facility] AT [facility] Surgery Entrance at the time directed by your surgeon and check in at the desk. If you have a living will or healthcare power of , please bring a copy. You will be taken to the pre-op holding area where you will be prepared for surgery.   A physical assessment will be performed by a nurse

## 2023-04-06 ENCOUNTER — HOSPITAL ENCOUNTER (OUTPATIENT)
Dept: WOMENS IMAGING | Age: 58
Discharge: HOME | End: 2023-04-08
Payer: COMMERCIAL

## 2023-04-06 DIAGNOSIS — N64.89 RADIAL SCAR OF LEFT BREAST: ICD-10-CM

## 2023-04-06 DIAGNOSIS — R92.8 ABNORMAL MAMMOGRAM: ICD-10-CM

## 2023-04-06 PROCEDURE — 19281 PERQ DEVICE BREAST 1ST IMAG: CPT

## 2023-04-17 ENCOUNTER — TELEPHONE (OUTPATIENT)
Dept: ONCOLOGY | Age: 58
End: 2023-04-17

## 2023-04-19 ENCOUNTER — ANESTHESIA EVENT (OUTPATIENT)
Dept: OPERATING ROOM | Age: 58
End: 2023-04-19
Payer: COMMERCIAL

## 2023-04-19 NOTE — PRE-PROCEDURE INSTRUCTIONS
Nothing to eat after midnight. Y  Are you taking any blood thinners? When was the last day? N  Make sure to use Hibiclens prior to surgery. Y  Remove any jewelry and body piercings. Y  Do you wear glasses? If so, please bring a case to store them in. Y  Are you having any Covid symptoms? N  Do you have any new rashes, infections, etc. that we should be aware of? N  Do you have a ride home the day of surgery? It cannot be a cab or medical transportation.  Y  Verify surgery time and what time to arrive at hospital. 0788

## 2023-04-20 ENCOUNTER — ANESTHESIA (OUTPATIENT)
Dept: OPERATING ROOM | Age: 58
End: 2023-04-20
Payer: COMMERCIAL

## 2023-04-20 ENCOUNTER — APPOINTMENT (OUTPATIENT)
Dept: WOMENS IMAGING | Age: 58
End: 2023-04-20
Attending: STUDENT IN AN ORGANIZED HEALTH CARE EDUCATION/TRAINING PROGRAM
Payer: COMMERCIAL

## 2023-04-20 ENCOUNTER — HOSPITAL ENCOUNTER (OUTPATIENT)
Age: 58
Setting detail: OUTPATIENT SURGERY
Discharge: HOME | End: 2023-04-20
Attending: STUDENT IN AN ORGANIZED HEALTH CARE EDUCATION/TRAINING PROGRAM | Admitting: STUDENT IN AN ORGANIZED HEALTH CARE EDUCATION/TRAINING PROGRAM
Payer: COMMERCIAL

## 2023-04-20 VITALS
HEIGHT: 68 IN | WEIGHT: 220 LBS | SYSTOLIC BLOOD PRESSURE: 120 MMHG | TEMPERATURE: 97.1 F | DIASTOLIC BLOOD PRESSURE: 68 MMHG | OXYGEN SATURATION: 99 % | HEART RATE: 74 BPM | RESPIRATION RATE: 20 BRPM | BODY MASS INDEX: 33.34 KG/M2

## 2023-04-20 DIAGNOSIS — N64.89 RADIAL SCAR OF LEFT BREAST: ICD-10-CM

## 2023-04-20 DIAGNOSIS — G89.18 ACUTE POST-OPERATIVE PAIN: Primary | ICD-10-CM

## 2023-04-20 DIAGNOSIS — R92.8 ABNORMAL MAMMOGRAM: ICD-10-CM

## 2023-04-20 LAB — HCG, PREGNANCY URINE (POC): NEGATIVE

## 2023-04-20 PROCEDURE — 2580000003 HC RX 258: Performed by: ANESTHESIOLOGY

## 2023-04-20 PROCEDURE — 3600000012 HC SURGERY LEVEL 2 ADDTL 15MIN: Performed by: STUDENT IN AN ORGANIZED HEALTH CARE EDUCATION/TRAINING PROGRAM

## 2023-04-20 PROCEDURE — 6360000002 HC RX W HCPCS: Performed by: NURSE ANESTHETIST, CERTIFIED REGISTERED

## 2023-04-20 PROCEDURE — 2500000003 HC RX 250 WO HCPCS: Performed by: STUDENT IN AN ORGANIZED HEALTH CARE EDUCATION/TRAINING PROGRAM

## 2023-04-20 PROCEDURE — 7100000031 HC ASPR PHASE II RECOVERY - ADDTL 15 MIN: Performed by: STUDENT IN AN ORGANIZED HEALTH CARE EDUCATION/TRAINING PROGRAM

## 2023-04-20 PROCEDURE — 3700000001 HC ADD 15 MINUTES (ANESTHESIA): Performed by: STUDENT IN AN ORGANIZED HEALTH CARE EDUCATION/TRAINING PROGRAM

## 2023-04-20 PROCEDURE — 3600000002 HC SURGERY LEVEL 2 BASE: Performed by: STUDENT IN AN ORGANIZED HEALTH CARE EDUCATION/TRAINING PROGRAM

## 2023-04-20 PROCEDURE — 2709999900 HC NON-CHARGEABLE SUPPLY: Performed by: STUDENT IN AN ORGANIZED HEALTH CARE EDUCATION/TRAINING PROGRAM

## 2023-04-20 PROCEDURE — 2500000003 HC RX 250 WO HCPCS: Performed by: NURSE ANESTHETIST, CERTIFIED REGISTERED

## 2023-04-20 PROCEDURE — 7100000011 HC PHASE II RECOVERY - ADDTL 15 MIN: Performed by: STUDENT IN AN ORGANIZED HEALTH CARE EDUCATION/TRAINING PROGRAM

## 2023-04-20 PROCEDURE — 6370000000 HC RX 637 (ALT 250 FOR IP): Performed by: ANESTHESIOLOGY

## 2023-04-20 PROCEDURE — 88300 SURGICAL PATH GROSS: CPT

## 2023-04-20 PROCEDURE — 7100000001 HC PACU RECOVERY - ADDTL 15 MIN: Performed by: STUDENT IN AN ORGANIZED HEALTH CARE EDUCATION/TRAINING PROGRAM

## 2023-04-20 PROCEDURE — 7100000010 HC PHASE II RECOVERY - FIRST 15 MIN: Performed by: STUDENT IN AN ORGANIZED HEALTH CARE EDUCATION/TRAINING PROGRAM

## 2023-04-20 PROCEDURE — 19125 EXCISION BREAST LESION: CPT | Performed by: STUDENT IN AN ORGANIZED HEALTH CARE EDUCATION/TRAINING PROGRAM

## 2023-04-20 PROCEDURE — 88305 TISSUE EXAM BY PATHOLOGIST: CPT

## 2023-04-20 PROCEDURE — 76098 X-RAY EXAM SURGICAL SPECIMEN: CPT

## 2023-04-20 PROCEDURE — 6360000002 HC RX W HCPCS: Performed by: STUDENT IN AN ORGANIZED HEALTH CARE EDUCATION/TRAINING PROGRAM

## 2023-04-20 PROCEDURE — 81025 URINE PREGNANCY TEST: CPT

## 2023-04-20 PROCEDURE — 7100000000 HC PACU RECOVERY - FIRST 15 MIN: Performed by: STUDENT IN AN ORGANIZED HEALTH CARE EDUCATION/TRAINING PROGRAM

## 2023-04-20 PROCEDURE — 2720000010 HC SURG SUPPLY STERILE: Performed by: STUDENT IN AN ORGANIZED HEALTH CARE EDUCATION/TRAINING PROGRAM

## 2023-04-20 PROCEDURE — 3700000000 HC ANESTHESIA ATTENDED CARE: Performed by: STUDENT IN AN ORGANIZED HEALTH CARE EDUCATION/TRAINING PROGRAM

## 2023-04-20 PROCEDURE — 7100000030 HC ASPR PHASE II RECOVERY - FIRST 15 MIN: Performed by: STUDENT IN AN ORGANIZED HEALTH CARE EDUCATION/TRAINING PROGRAM

## 2023-04-20 PROCEDURE — 88307 TISSUE EXAM BY PATHOLOGIST: CPT

## 2023-04-20 PROCEDURE — 2500000003 HC RX 250 WO HCPCS: Performed by: ANESTHESIOLOGY

## 2023-04-20 PROCEDURE — 19120 REMOVAL OF BREAST LESION: CPT | Performed by: STUDENT IN AN ORGANIZED HEALTH CARE EDUCATION/TRAINING PROGRAM

## 2023-04-20 RX ORDER — SODIUM CHLORIDE 0.9 % (FLUSH) 0.9 %
5-40 SYRINGE (ML) INJECTION EVERY 12 HOURS SCHEDULED
Status: DISCONTINUED | OUTPATIENT
Start: 2023-04-20 | End: 2023-04-20 | Stop reason: HOSPADM

## 2023-04-20 RX ORDER — GLYCOPYRROLATE 0.2 MG/ML
INJECTION INTRAMUSCULAR; INTRAVENOUS PRN
Status: DISCONTINUED | OUTPATIENT
Start: 2023-04-20 | End: 2023-04-20 | Stop reason: SDUPTHER

## 2023-04-20 RX ORDER — SODIUM CHLORIDE, SODIUM LACTATE, POTASSIUM CHLORIDE, CALCIUM CHLORIDE 600; 310; 30; 20 MG/100ML; MG/100ML; MG/100ML; MG/100ML
INJECTION, SOLUTION INTRAVENOUS CONTINUOUS
Status: DISCONTINUED | OUTPATIENT
Start: 2023-04-20 | End: 2023-04-20 | Stop reason: HOSPADM

## 2023-04-20 RX ORDER — PROPOFOL 10 MG/ML
INJECTION, EMULSION INTRAVENOUS PRN
Status: DISCONTINUED | OUTPATIENT
Start: 2023-04-20 | End: 2023-04-20 | Stop reason: SDUPTHER

## 2023-04-20 RX ORDER — DEXAMETHASONE SODIUM PHOSPHATE 4 MG/ML
INJECTION, SOLUTION INTRA-ARTICULAR; INTRALESIONAL; INTRAMUSCULAR; INTRAVENOUS; SOFT TISSUE PRN
Status: DISCONTINUED | OUTPATIENT
Start: 2023-04-20 | End: 2023-04-20 | Stop reason: SDUPTHER

## 2023-04-20 RX ORDER — GABAPENTIN 300 MG/1
300 CAPSULE ORAL ONCE
Status: COMPLETED | OUTPATIENT
Start: 2023-04-20 | End: 2023-04-20

## 2023-04-20 RX ORDER — LABETALOL HYDROCHLORIDE 5 MG/ML
10 INJECTION, SOLUTION INTRAVENOUS
Status: DISCONTINUED | OUTPATIENT
Start: 2023-04-20 | End: 2023-04-20 | Stop reason: HOSPADM

## 2023-04-20 RX ORDER — ONDANSETRON 2 MG/ML
4 INJECTION INTRAMUSCULAR; INTRAVENOUS
Status: DISCONTINUED | OUTPATIENT
Start: 2023-04-20 | End: 2023-04-20 | Stop reason: HOSPADM

## 2023-04-20 RX ORDER — HYDROCODONE BITARTRATE AND ACETAMINOPHEN 5; 325 MG/1; MG/1
1 TABLET ORAL EVERY 6 HOURS PRN
Status: DISCONTINUED | OUTPATIENT
Start: 2023-04-20 | End: 2023-04-20 | Stop reason: HOSPADM

## 2023-04-20 RX ORDER — ACETAMINOPHEN 500 MG
1000 TABLET ORAL ONCE
Status: COMPLETED | OUTPATIENT
Start: 2023-04-20 | End: 2023-04-20

## 2023-04-20 RX ORDER — LIDOCAINE HYDROCHLORIDE 10 MG/ML
1 INJECTION, SOLUTION EPIDURAL; INFILTRATION; INTRACAUDAL; PERINEURAL
Status: COMPLETED | OUTPATIENT
Start: 2023-04-20 | End: 2023-04-20

## 2023-04-20 RX ORDER — SODIUM CHLORIDE 0.9 % (FLUSH) 0.9 %
5-40 SYRINGE (ML) INJECTION PRN
Status: DISCONTINUED | OUTPATIENT
Start: 2023-04-20 | End: 2023-04-20 | Stop reason: HOSPADM

## 2023-04-20 RX ORDER — MIDAZOLAM HYDROCHLORIDE 1 MG/ML
INJECTION INTRAMUSCULAR; INTRAVENOUS PRN
Status: DISCONTINUED | OUTPATIENT
Start: 2023-04-20 | End: 2023-04-20 | Stop reason: SDUPTHER

## 2023-04-20 RX ORDER — BUPIVACAINE HYDROCHLORIDE 2.5 MG/ML
INJECTION, SOLUTION EPIDURAL; INFILTRATION; INTRACAUDAL PRN
Status: DISCONTINUED | OUTPATIENT
Start: 2023-04-20 | End: 2023-04-20 | Stop reason: ALTCHOICE

## 2023-04-20 RX ORDER — SODIUM CHLORIDE 9 MG/ML
INJECTION, SOLUTION INTRAVENOUS PRN
Status: DISCONTINUED | OUTPATIENT
Start: 2023-04-20 | End: 2023-04-20 | Stop reason: HOSPADM

## 2023-04-20 RX ORDER — ONDANSETRON 2 MG/ML
INJECTION INTRAMUSCULAR; INTRAVENOUS PRN
Status: DISCONTINUED | OUTPATIENT
Start: 2023-04-20 | End: 2023-04-20 | Stop reason: SDUPTHER

## 2023-04-20 RX ORDER — EPHEDRINE SULFATE/0.9% NACL/PF 50 MG/5 ML
SYRINGE (ML) INTRAVENOUS PRN
Status: DISCONTINUED | OUTPATIENT
Start: 2023-04-20 | End: 2023-04-20 | Stop reason: SDUPTHER

## 2023-04-20 RX ORDER — FENTANYL CITRATE 50 UG/ML
INJECTION, SOLUTION INTRAMUSCULAR; INTRAVENOUS PRN
Status: DISCONTINUED | OUTPATIENT
Start: 2023-04-20 | End: 2023-04-20 | Stop reason: SDUPTHER

## 2023-04-20 RX ORDER — FENTANYL CITRATE 0.05 MG/ML
25 INJECTION, SOLUTION INTRAMUSCULAR; INTRAVENOUS EVERY 5 MIN PRN
Status: DISCONTINUED | OUTPATIENT
Start: 2023-04-20 | End: 2023-04-20 | Stop reason: HOSPADM

## 2023-04-20 RX ORDER — OXYCODONE HYDROCHLORIDE 5 MG/1
5 TABLET ORAL EVERY 8 HOURS PRN
Qty: 9 TABLET | Refills: 0 | Status: SHIPPED | OUTPATIENT
Start: 2023-04-20 | End: 2023-04-23

## 2023-04-20 RX ORDER — DOCUSATE SODIUM 100 MG/1
100 CAPSULE, LIQUID FILLED ORAL 2 TIMES DAILY PRN
Qty: 20 CAPSULE | Refills: 0 | Status: SHIPPED | OUTPATIENT
Start: 2023-04-20

## 2023-04-20 RX ORDER — DIPHENHYDRAMINE HYDROCHLORIDE 50 MG/ML
12.5 INJECTION INTRAMUSCULAR; INTRAVENOUS
Status: DISCONTINUED | OUTPATIENT
Start: 2023-04-20 | End: 2023-04-20 | Stop reason: HOSPADM

## 2023-04-20 RX ORDER — HYDRALAZINE HYDROCHLORIDE 20 MG/ML
10 INJECTION INTRAMUSCULAR; INTRAVENOUS
Status: DISCONTINUED | OUTPATIENT
Start: 2023-04-20 | End: 2023-04-20 | Stop reason: HOSPADM

## 2023-04-20 RX ORDER — SODIUM CHLORIDE 9 MG/ML
25 INJECTION, SOLUTION INTRAVENOUS PRN
Status: DISCONTINUED | OUTPATIENT
Start: 2023-04-20 | End: 2023-04-20 | Stop reason: HOSPADM

## 2023-04-20 RX ORDER — ONDANSETRON 4 MG/1
4 TABLET, FILM COATED ORAL 3 TIMES DAILY PRN
Qty: 15 TABLET | Refills: 0 | Status: SHIPPED | OUTPATIENT
Start: 2023-04-20

## 2023-04-20 RX ORDER — LIDOCAINE HYDROCHLORIDE 20 MG/ML
INJECTION, SOLUTION INFILTRATION; PERINEURAL PRN
Status: DISCONTINUED | OUTPATIENT
Start: 2023-04-20 | End: 2023-04-20 | Stop reason: SDUPTHER

## 2023-04-20 RX ORDER — METOCLOPRAMIDE HYDROCHLORIDE 5 MG/ML
10 INJECTION INTRAMUSCULAR; INTRAVENOUS
Status: DISCONTINUED | OUTPATIENT
Start: 2023-04-20 | End: 2023-04-20 | Stop reason: HOSPADM

## 2023-04-20 RX ADMIN — GABAPENTIN 300 MG: 300 CAPSULE ORAL at 08:09

## 2023-04-20 RX ADMIN — GLYCOPYRROLATE 0.2 MG: 0.2 INJECTION INTRAMUSCULAR; INTRAVENOUS at 10:15

## 2023-04-20 RX ADMIN — FENTANYL CITRATE 50 MCG: 50 INJECTION, SOLUTION INTRAMUSCULAR; INTRAVENOUS at 10:01

## 2023-04-20 RX ADMIN — FENTANYL CITRATE 25 MCG: 50 INJECTION, SOLUTION INTRAMUSCULAR; INTRAVENOUS at 10:27

## 2023-04-20 RX ADMIN — ACETAMINOPHEN 1000 MG: 500 TABLET ORAL at 08:09

## 2023-04-20 RX ADMIN — SODIUM CHLORIDE, POTASSIUM CHLORIDE, SODIUM LACTATE AND CALCIUM CHLORIDE: 600; 310; 30; 20 INJECTION, SOLUTION INTRAVENOUS at 11:35

## 2023-04-20 RX ADMIN — MIDAZOLAM 2 MG: 1 INJECTION INTRAMUSCULAR; INTRAVENOUS at 09:55

## 2023-04-20 RX ADMIN — FENTANYL CITRATE 25 MCG: 50 INJECTION, SOLUTION INTRAMUSCULAR; INTRAVENOUS at 10:43

## 2023-04-20 RX ADMIN — SODIUM CHLORIDE, POTASSIUM CHLORIDE, SODIUM LACTATE AND CALCIUM CHLORIDE: 600; 310; 30; 20 INJECTION, SOLUTION INTRAVENOUS at 08:08

## 2023-04-20 RX ADMIN — Medication 2000 MG: at 10:05

## 2023-04-20 RX ADMIN — LIDOCAINE HYDROCHLORIDE 80 MG: 20 INJECTION, SOLUTION INFILTRATION; PERINEURAL at 10:01

## 2023-04-20 RX ADMIN — Medication 20 MG: at 10:08

## 2023-04-20 RX ADMIN — LIDOCAINE HYDROCHLORIDE 1 ML: 10 INJECTION, SOLUTION EPIDURAL; INFILTRATION; INTRACAUDAL; PERINEURAL at 08:08

## 2023-04-20 RX ADMIN — Medication 20 MG: at 10:12

## 2023-04-20 RX ADMIN — PROPOFOL 200 MG: 10 INJECTION, EMULSION INTRAVENOUS at 10:01

## 2023-04-20 RX ADMIN — ONDANSETRON 4 MG: 2 INJECTION INTRAMUSCULAR; INTRAVENOUS at 10:06

## 2023-04-20 RX ADMIN — DEXAMETHASONE SODIUM PHOSPHATE 4 MG: 4 INJECTION, SOLUTION INTRAMUSCULAR; INTRAVENOUS at 10:06

## 2023-04-20 RX ADMIN — Medication 10 MG: at 10:10

## 2023-04-20 ASSESSMENT — PAIN DESCRIPTION - LOCATION: LOCATION: BREAST

## 2023-04-20 ASSESSMENT — PAIN DESCRIPTION - DESCRIPTORS
DESCRIPTORS: ACHING;SORE
DESCRIPTORS: ACHING

## 2023-04-20 ASSESSMENT — PAIN DESCRIPTION - ORIENTATION: ORIENTATION: LEFT

## 2023-04-20 ASSESSMENT — PAIN - FUNCTIONAL ASSESSMENT: PAIN_FUNCTIONAL_ASSESSMENT: 0-10

## 2023-04-20 ASSESSMENT — PAIN SCALES - GENERAL
PAINLEVEL_OUTOF10: 1
PAINLEVEL_OUTOF10: 1

## 2023-04-20 ASSESSMENT — PAIN DESCRIPTION - PAIN TYPE: TYPE: SURGICAL PAIN

## 2023-04-20 NOTE — ANESTHESIA PRE PROCEDURE
at 04/20/23 0904 NoRateChange at 04/20/23 0904    ceFAZolin (ANCEF) 2000 mg in 0.9% sodium chloride 50 mL IVPB  2,000 mg IntraVENous Once Shelia Elaine DO           Allergies: Allergies   Allergen Reactions    Dye [Iodides] Rash     Skin burned, raised rash on body    Bactrim [Sulfamethoxazole-Trimethoprim] Other (See Comments)     Blood in stools       Problem List:    Patient Active Problem List   Diagnosis Code    Mild depression F32. 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    KLAUDIA (stress urinary incontinence, female) 08/19/2014       Past Surgical History:        Procedure Laterality Date    ANUS SURGERY      cyst I and D    LINDA STEROTACTIC LOC BREAST BIOPSY LEFT Left 2019    OTHER SURGICAL HISTORY      cervical biopsy    TONSILLECTOMY         Social History:    Social History     Tobacco Use    Smoking status: Never    Smokeless tobacco: Never   Substance Use Topics    Alcohol use: Yes     Comment: socially                                Counseling given: Not Answered      Vital Signs (Current):   Vitals:    04/20/23 0729 04/20/23 0749   BP:  (!) 94/56   Pulse:  59   Resp:  16   Temp:  97.1 °F (36.2 °C)   TempSrc:  Infrared   SpO2:  98%   Weight: 220 lb (99.8 kg)    Height: 5' 8\" (1.727 m)                                               BP Readings from Last 3 Encounters:   04/20/23 (!) 94/56   04/05/23 104/63   03/27/23 93/60       NPO Status: Time of last liquid consumption: 2100                        Time of last solid consumption: 2100

## 2023-04-20 NOTE — INTERVAL H&P NOTE
Update History & Physical    The patient's History and Physical of   April 5, 2023     Patient will be having : Procedure(s):  EXCISION BIOPSY BREAST W/LOCALIZER  The surgical site was confirmed by the  patient and me. There was no change. Or updates at this time. Patient did  not require clearance. Patient has been NPO since midnight. No blood thinners in the past 5-7 days. Patient denies any personal or family problems with anesthesia. Patient was physically assessed, including cardiovascular and respiratory. Patient understands and wants to proceed with the procedure.      Electronically signed by RANDELL Santiago CNP on 4/20/2023 at 7:20 AM    Note marked for cosign by provider

## 2023-04-20 NOTE — ANESTHESIA POSTPROCEDURE EVALUATION
Department of Anesthesiology  Postprocedure Note    Patient: Osorio Cordero  MRN: [id number]  YOB: 1965  Date of evaluation: 4/20/2023      Procedure Summary     Date: 04/20/23 Room / Location: [address] [place] [address] Reece Yuan  / [facility]: PALAK SIFUENTES    Anesthesia Start: 4866 Anesthesia Stop: 9412    Procedure: EXCISION BIOPSY BREAST WITH NEEDLE LOCALIZATION (Left: Breast) Diagnosis:       Radial scar of left breast      (Radial scar of left breast [N64.89])    Surgeons: Sánchez Orellana DO Responsible Provider: Carlie Parra MD    Anesthesia Type: general ASA Status: 2          Anesthesia Type: No value filed.     Ale Phase I: Ale Score: 10    Ale Phase II: Ale Score: 10      Anesthesia Post Evaluation    Comments: POST- ANESTHESIA EVALUATION       Pt Name: Osorio Cordero  MRN: [id number]  YOB: 1965  Date of evaluation: 4/20/2023  Time:  2:11 PM      /68   Pulse 74   Temp 97.1 °F (36.2 °C) (Temporal)   Resp 20   Ht 5' 8\" (1.727 m)   Wt 220 lb (99.8 kg)   SpO2 99%   BMI 33.45 kg/m²      Consciousness Level  Awake  Cardiopulmonary Status  Stable  Pain Adequately Treated YES  Nausea / Vomiting  NO  Adequate Hydration  YES  Anesthesia Related Complications NONE      Electronically signed by Carlie Parra MD on 4/20/2023 at 2:11 PM

## 2023-04-20 NOTE — DISCHARGE INSTRUCTIONS
full of Miralax (over the counter) daily until you achieve a bowel movement. Narcotic medications cause constipation. If you become constipated, it may be painful at your incision sites to have bowel movements. Pain Medications:  Schedule Tylenol 1,000 mg every 8 hours (3 times daily)  Schedule Motrin 400-600 mg every 6 hours (4 times daily)  Do not take this medication if you have kidney problems  Narcotics will be prescribed for you for 3-5 days post operatively - unless discussed pre-operatively. Take the narcotic medications as prescribed and wean as quickly as possible. Most patients require less than prescribed  You underwent surgery and will experience pain at the incision sites. This is normal. The pain will improve over the first 7-10 days with the expectation it may take 4-6 weeks before feeling back to your normal self. Work to wean of narcotic medications as quickly as able. These medications have the risk of addiction  Anticoagulation (blood thinners)  Ok to continue or restart Aspirin   Restart other anticoagulation -- (Coumadin, Eliquis, Plavix, etc) in 48 hours after surgery. If you have questions on what medications are ok to take -- ask your doctor. FOLLOW UP:   Call and schedule post operative follow up with Dr. Denilson Mendoza in 2 weeks - call sooner if there are questions or concerns.     WHEN TO CALL:  If you experience a fever of 101 or greater  If you notice redness around your incision which is increasing in size  If you notice bloody, green, or brown drainage from your incision  If you are having episodes of vomiting and unable to stay hydrated

## 2023-04-21 NOTE — OP NOTE
Operative Note      Patient: Ratna Smith  YOB: 1965  MRN: [id number]    Date of Procedure: 4/20/2023    Pre-Op Diagnosis: Radial scar left breast    Post-Op Diagnosis: Same, awaiting pathology       Procedure:  Excisional biopsy of left breast radial scar utilizing localizer    Surgeon(s):  Justine Harris DO    Assistant:   First Assistant: Brooke Padron    Anesthesia: General    Estimated Blood Loss (mL): 50    Complications: None    Specimens:   ID Type Source Tests Collected by Time Destination   A : LEFT BREAST LESION, BLACK SHORT STITCH= SUPERIOR LATERAL, BLACK LONG STITCH= LATERAL, BLUE STITCH MARKS DEEP INFERIOR MEDIAL MARGIN Tissue Breast SURGICAL PATHOLOGY Shelia SPRINGER Imel, DO 4/20/2023 1122    B : NEW MEDIAL MARGIN SUTURE MARKS NEW MEDIAL MARGIN Tissue Breast SURGICAL PATHOLOGY Media Destin Imel, DO 4/20/2023 1128    C : LEFT BREAST CLIP Hardware Breast SURGICAL PATHOLOGY Media Destin Imel, DO 4/20/2023 1131        Implants: None      Drains: None    Findings: Utilizing mammogram and RF transistor seeds x2, excisional breast biopsy was performed. Indications: Katharina Bowser is a 55-year-old female who underwent a screening mammogram January 23 which revealed focal asymmetry in the area of a previous biopsy radial scar. This was followed by diagnostic mammogram in February 2023 confirming the asymmetry in the left breast in the upper outer quadrant. She was seen in the office for evaluation discussion was had on serial imaging versus excisional biopsy due to the concern for lesion being high risk. After discussion of risk versus benefits the patient requested to proceed to the operating room for excisional biopsy of the radial scar. Preoperatively the patient was sent to radiology for localizer seed placement. Detailed Description of Procedure: The patient was brought to the operating room and placed on the OR table in supine position.   General anesthesia was induced by the anesthesia team.  A no

## 2023-04-23 DIAGNOSIS — G47.09 OTHER INSOMNIA: ICD-10-CM

## 2023-04-24 ENCOUNTER — OFFICE VISIT (OUTPATIENT)
Dept: SURGERY | Age: 58
End: 2023-04-24

## 2023-04-24 VITALS
BODY MASS INDEX: 33.45 KG/M2 | HEART RATE: 82 BPM | SYSTOLIC BLOOD PRESSURE: 106 MMHG | TEMPERATURE: 96.3 F | WEIGHT: 220 LBS | DIASTOLIC BLOOD PRESSURE: 69 MMHG

## 2023-04-24 DIAGNOSIS — Z98.890 S/P BREAST BIOPSY: Primary | ICD-10-CM

## 2023-04-24 PROCEDURE — 99024 POSTOP FOLLOW-UP VISIT: CPT | Performed by: STUDENT IN AN ORGANIZED HEALTH CARE EDUCATION/TRAINING PROGRAM

## 2023-04-24 NOTE — TELEPHONE ENCOUNTER
Request for trazodone      Next Visit Date:6/6/23  Future Appointments   Date Time Provider Artis Meaghan   4/24/2023 11:30 AM [facility]   5/1/2023 11:30 AM Javi [facility]   6/6/2023  1:40 PM Kishore Horne MD [facility] IM [address] Maintenance   Topic Date Due    COVID-19 Vaccine (1) Never done    Shingles vaccine (1 of 2) Never done    Cervical cancer screen  08/30/2021    Lipids  04/05/2023    Depression Monitoring  04/21/2023    Annual Wellness Visit (AWV)  04/22/2023    DTaP/Tdap/Td vaccine (1 - Tdap) 07/24/2024 (Originally 7/25/2014)    Flu vaccine (Season Ended) 08/01/2023    Colorectal Cancer Screen  09/20/2023    Diabetes screen  04/05/2025    Breast cancer screen  04/20/2025    Hepatitis C screen  Completed    HIV screen  Completed    Hepatitis A vaccine  Aged Out    Hib vaccine  Aged Out    Meningococcal (ACWY) vaccine  Aged Out    Pneumococcal 0-64 years Vaccine  Aged Out       Hemoglobin A1C (%)   Date Value   04/05/2022 5.3             ( goal A1C is < 7)   No results found for: LABMICR  LDL Cholesterol (mg/dL)   Date Value   04/05/2022 121       (goal LDL is <100)   AST (U/L)   Date Value   04/05/2022 21     ALT (U/L)   Date Value   04/05/2022 17     BUN (mg/dL)   Date Value   04/05/2023 11     BP Readings from Last 3 Encounters:   04/20/23 120/68   04/05/23 104/63   03/27/23 93/60          (goal 120/80)    All Future Testing planned in [facility]  Lab Frequency Next Occurrence         Patient Active Problem List:     Mild depression     KLAUDIA (stress urinary incontinence, female)     Hyperlipidemia     ASCUS of cervix with negative high risk HPV

## 2023-04-25 LAB — SURGICAL PATHOLOGY REPORT: NORMAL

## 2023-04-25 NOTE — PROGRESS NOTES
Office Note -- Post op visit      Patient: Yolanda Sun  MRN: [id number]  YOB: 1965 (65 y.o.)    Date of Office Visit: 4/24/2023    CC: Post op follow up    SUBJECTIVE:  Yolanda Sun is a 65 y.o. female who returns to the [facility] surgery clinic for post op follow up from excisional breast biopsy utilizing localizer on 4/20/2023. Eating a regular diet without difficulty. Bowel movements are Normal.  Pain is controlled with current analgesics. Medication(s) being used: acetaminophen, ibuprofen (OTC). .     Review of Systems:  GEN: Denies fevers, chills. CV: Denies chest pain. RESP: Denies shortness of breath, COPD, asthma. GI: Denies abdominal pain, nausea, vomiting  : Denies increased frequency or dysuria. SKIN: Denies skin lesions or incisional dehiscence. Physical Exam:    Vitals:    04/24/23 1121   BP: 106/69   Pulse: 82   Temp: (!) 96.3 °F (35.7 °C)       General: Alert and oriented x 3. Non toxic in appearance. No apparent distress. Heart: Regular rate and rhythm. Lungs: symmetrical chest rise bilaterally, no respiratory distress. Clear breath sounds bilaterally. Abdomen:   Skin: No rashes or nodules noted. Incisions clean, dry and intact, healing well. Pathology:     Pending      Assessment:  Radial sclerosing scar left breast status post excisional biopsy    Plan:  Follow-up pathology. Incision reviewed in clinic appears clean, dry, intact. Discussed with patient that she can start removing the Dermabond next week. We discussed the surgery. All questions were answered. We will plan to call patient once we have pathology results. Patient does not need to follow-up in the clinic unless she has concerns which arise. She needs to perform yearly mammograms and annual breast MRI due to her life time risk of breast cancer being 20% based on family history.         Jhony Elaine DO  4/25/2023

## 2023-04-26 ENCOUNTER — TELEPHONE (OUTPATIENT)
Dept: SURGERY | Age: 58
End: 2023-04-26

## 2023-04-26 DIAGNOSIS — D05.02 NEOPLASM OF LEFT BREAST, PRIMARY TUMOR STAGING CATEGORY TIS: LOBULAR CARCINOMA IN SITU (LCIS): Primary | ICD-10-CM

## 2023-04-26 RX ORDER — TRAZODONE HYDROCHLORIDE 100 MG/1
100 TABLET ORAL NIGHTLY
Qty: 30 TABLET | Refills: 1 | Status: SHIPPED | OUTPATIENT
Start: 2023-04-26

## 2023-04-26 NOTE — TELEPHONE ENCOUNTER
Spoke with Hossein Howell on her pathology results from her breast biopsy. Given her risk of breast cancer, along with her high risk lesions such as LCIS, I discussed referral to medical oncology to discuss if chemopreventative medications are necessary.      Electronically signed by Debi Marques DO on 4/26/2023 at 8:18 AM

## 2023-05-04 ENCOUNTER — INITIAL CONSULT (OUTPATIENT)
Dept: ONCOLOGY | Age: 58
End: 2023-05-04
Payer: COMMERCIAL

## 2023-05-04 ENCOUNTER — TELEPHONE (OUTPATIENT)
Dept: ONCOLOGY | Age: 58
End: 2023-05-04

## 2023-05-04 VITALS
BODY MASS INDEX: 33.39 KG/M2 | WEIGHT: 220.3 LBS | RESPIRATION RATE: 14 BRPM | HEART RATE: 62 BPM | HEIGHT: 68 IN | OXYGEN SATURATION: 99 % | DIASTOLIC BLOOD PRESSURE: 61 MMHG | SYSTOLIC BLOOD PRESSURE: 92 MMHG | TEMPERATURE: 97.2 F

## 2023-05-04 DIAGNOSIS — D05.02 NEOPLASM OF LEFT BREAST, PRIMARY TUMOR STAGING CATEGORY TIS: LOBULAR CARCINOMA IN SITU (LCIS): ICD-10-CM

## 2023-05-04 DIAGNOSIS — N64.9 BREAST LESION: Primary | ICD-10-CM

## 2023-05-04 PROCEDURE — 99205 OFFICE O/P NEW HI 60 MIN: CPT | Performed by: INTERNAL MEDICINE

## 2023-05-04 PROCEDURE — 99202 OFFICE O/P NEW SF 15 MIN: CPT | Performed by: INTERNAL MEDICINE

## 2023-05-04 RX ORDER — TAMOXIFEN CITRATE 20 MG/1
20 TABLET ORAL DAILY
Qty: 90 TABLET | Refills: 1 | Status: SHIPPED | OUTPATIENT
Start: 2023-05-04 | End: 2023-08-02

## 2023-05-04 NOTE — PROGRESS NOTES
DIAGNOSIS:   At high risk for breast cancer because of personal and family history  LCIS of the left breast  CURRENT THERAPY:  Genetic testing is negative  Considering chemoprevention with tamoxifen  BRIEF CASE HISTORY:   Rochelle Abdi is a very pleasant 65 y.o. female who is referred to us for recently found LCIS in the left breast.  She was identified as a high risk for breast cancer patient  based on personal and family history. Tyrer-Cuzick risk model predicted 20% chances of developing breast cancer. She was referred to genetic counseling and her genetic testing was negative. Breast imaging showed no abnormality of the upper outer quadrant of the left breast.  This was marked previously. At this time, the decision was to proceed with wire localized lumpectomy which occurred in April/2023. Pathology showed fibroadenosis but there was LCIS in the specimen. She is sent to us for a consultation, and especially to consider chemoprevention considering her LCIS histology. Patient feels well and recovered well from surgery. She has no specific questions. PAST MEDICAL HISTORY: has a past medical history of Ankle sprain, Anxiety, ASCUS of cervix with negative high risk HPV, Depression, Generalized pain, Hyperlipidemia, Obesity, Osteoarthritis, Pituitary tumor, and KLAUDIA (stress urinary incontinence, female). PAST SURGICAL HISTORY: has a past surgical history that includes Anus surgery; Tonsillectomy; other surgical history; LINDA STEREO BREAST BX W LOC DEVICE 1ST LESION LEFT (Left, 2019); and Breast biopsy (Left, 4/20/2023). CURRENT MEDICATIONS:  has a current medication list which includes the following prescription(s): tamoxifen, trazodone, NONFORMULARY, escitalopram, pravastatin, folic acid, vitamin d3, multiple vitamin, vitamin b-12, acetaminophen, and ibuprofen. ALLERGIES:  is allergic to dye [iodides], pecan nut (diagnostic), bactrim [sulfamethoxazole-trimethoprim], and wheat.     FAMILY

## 2023-05-04 NOTE — TELEPHONE ENCOUNTER
AVS from 5/4/23      Rv in 3 months.  With cbc, cmp     Rv scheduled for 8/15 @ 1:00 pm with labs at md visit    Pt was given AVS and appointment schedule    Electronically signed by Eusebia Woodall on 5/4/2023 at 10:38 AM

## 2023-05-26 DIAGNOSIS — E78.00 PURE HYPERCHOLESTEROLEMIA: ICD-10-CM

## 2023-05-30 RX ORDER — PRAVASTATIN SODIUM 40 MG
TABLET ORAL
Qty: 30 TABLET | Refills: 1 | Status: SHIPPED | OUTPATIENT
Start: 2023-05-30 | End: 2023-07-20 | Stop reason: SDUPTHER

## 2023-06-06 ENCOUNTER — OFFICE VISIT (OUTPATIENT)
Dept: INTERNAL MEDICINE | Age: 58
End: 2023-06-06
Payer: COMMERCIAL

## 2023-06-06 ENCOUNTER — HOSPITAL ENCOUNTER (OUTPATIENT)
Age: 58
Setting detail: SPECIMEN
Discharge: HOME | End: 2023-06-06

## 2023-06-06 VITALS
HEIGHT: 68 IN | SYSTOLIC BLOOD PRESSURE: 114 MMHG | OXYGEN SATURATION: 98 % | HEART RATE: 65 BPM | DIASTOLIC BLOOD PRESSURE: 58 MMHG | TEMPERATURE: 97.2 F | WEIGHT: 217 LBS | BODY MASS INDEX: 32.89 KG/M2

## 2023-06-06 DIAGNOSIS — D64.9 NORMOCYTIC ANEMIA: ICD-10-CM

## 2023-06-06 DIAGNOSIS — E53.8 LOW FOLIC ACID: ICD-10-CM

## 2023-06-06 DIAGNOSIS — R73.9 HYPERGLYCEMIA: ICD-10-CM

## 2023-06-06 DIAGNOSIS — J30.2 SEASONAL ALLERGIES: ICD-10-CM

## 2023-06-06 DIAGNOSIS — F41.9 ANXIETY: ICD-10-CM

## 2023-06-06 DIAGNOSIS — G47.09 OTHER INSOMNIA: ICD-10-CM

## 2023-06-06 DIAGNOSIS — F32.0 MAJOR DEPRESSIVE DISORDER, SINGLE EPISODE, MILD (HCC): ICD-10-CM

## 2023-06-06 DIAGNOSIS — E78.00 PURE HYPERCHOLESTEROLEMIA: Primary | ICD-10-CM

## 2023-06-06 DIAGNOSIS — E78.00 PURE HYPERCHOLESTEROLEMIA: ICD-10-CM

## 2023-06-06 LAB
CHOLEST SERPL-MCNC: 187 MG/DL
CHOLESTEROL/HDL RATIO: 2.3
EST. AVERAGE GLUCOSE BLD GHB EST-MCNC: 111 MG/DL
HBA1C MFR BLD: 5.5 % (ref 4–6)
HDLC SERPL-MCNC: 80 MG/DL
LDLC SERPL CALC-MCNC: 94 MG/DL (ref 0–130)
TRIGL SERPL-MCNC: 64 MG/DL

## 2023-06-06 PROCEDURE — 99213 OFFICE O/P EST LOW 20 MIN: CPT | Performed by: INTERNAL MEDICINE

## 2023-06-06 RX ORDER — FOLIC ACID 1 MG/1
1 TABLET ORAL DAILY
Qty: 30 TABLET | Refills: 4 | Status: SHIPPED | OUTPATIENT
Start: 2023-06-06

## 2023-06-06 RX ORDER — CHOLECALCIFEROL (VITAMIN D3) 125 MCG
1 CAPSULE ORAL DAILY
Qty: 30 TABLET | Refills: 4 | Status: SHIPPED | OUTPATIENT
Start: 2023-06-06

## 2023-06-06 RX ORDER — LORATADINE AND PSEUDOEPHEDRINE SULFATE 5; 120 MG/1; MG/1
1 TABLET, EXTENDED RELEASE ORAL 2 TIMES DAILY
Qty: 60 TABLET | Refills: 0 | Status: SHIPPED | OUTPATIENT
Start: 2023-06-06

## 2023-06-06 RX ORDER — ESCITALOPRAM OXALATE 20 MG/1
TABLET ORAL
Qty: 30 TABLET | Refills: 5 | Status: SHIPPED | OUTPATIENT
Start: 2023-06-06

## 2023-06-06 RX ORDER — TRAZODONE HYDROCHLORIDE 100 MG/1
100 TABLET ORAL NIGHTLY
Qty: 30 TABLET | Refills: 5 | Status: SHIPPED | OUTPATIENT
Start: 2023-06-06

## 2023-06-06 SDOH — ECONOMIC STABILITY: HOUSING INSECURITY
IN THE LAST 12 MONTHS, WAS THERE A TIME WHEN YOU DID NOT HAVE A STEADY PLACE TO SLEEP OR SLEPT IN A SHELTER (INCLUDING NOW)?: NO

## 2023-06-06 SDOH — ECONOMIC STABILITY: INCOME INSECURITY: HOW HARD IS IT FOR YOU TO PAY FOR THE VERY BASICS LIKE FOOD, HOUSING, MEDICAL CARE, AND HEATING?: NOT HARD AT ALL

## 2023-06-06 SDOH — ECONOMIC STABILITY: FOOD INSECURITY: WITHIN THE PAST 12 MONTHS, YOU WORRIED THAT YOUR FOOD WOULD RUN OUT BEFORE YOU GOT MONEY TO BUY MORE.: NEVER TRUE

## 2023-06-06 SDOH — ECONOMIC STABILITY: FOOD INSECURITY: WITHIN THE PAST 12 MONTHS, THE FOOD YOU BOUGHT JUST DIDN'T LAST AND YOU DIDN'T HAVE MONEY TO GET MORE.: NEVER TRUE

## 2023-06-06 ASSESSMENT — PATIENT HEALTH QUESTIONNAIRE - PHQ9
3. TROUBLE FALLING OR STAYING ASLEEP: 0
1. LITTLE INTEREST OR PLEASURE IN DOING THINGS: 0
SUM OF ALL RESPONSES TO PHQ QUESTIONS 1-9: 0
6. FEELING BAD ABOUT YOURSELF - OR THAT YOU ARE A FAILURE OR HAVE LET YOURSELF OR YOUR FAMILY DOWN: 0
2. FEELING DOWN, DEPRESSED OR HOPELESS: 0
7. TROUBLE CONCENTRATING ON THINGS, SUCH AS READING THE NEWSPAPER OR WATCHING TELEVISION: 0
SUM OF ALL RESPONSES TO PHQ9 QUESTIONS 1 & 2: 0
SUM OF ALL RESPONSES TO PHQ QUESTIONS 1-9: 0
4. FEELING TIRED OR HAVING LITTLE ENERGY: 0
SUM OF ALL RESPONSES TO PHQ QUESTIONS 1-9: 0
10. IF YOU CHECKED OFF ANY PROBLEMS, HOW DIFFICULT HAVE THESE PROBLEMS MADE IT FOR YOU TO DO YOUR WORK, TAKE CARE OF THINGS AT HOME, OR GET ALONG WITH OTHER PEOPLE: 0
9. THOUGHTS THAT YOU WOULD BE BETTER OFF DEAD, OR OF HURTING YOURSELF: 0
5. POOR APPETITE OR OVEREATING: 0
SUM OF ALL RESPONSES TO PHQ QUESTIONS 1-9: 0
8. MOVING OR SPEAKING SO SLOWLY THAT OTHER PEOPLE COULD HAVE NOTICED. OR THE OPPOSITE, BEING SO FIGETY OR RESTLESS THAT YOU HAVE BEEN MOVING AROUND A LOT MORE THAN USUAL: 0

## 2023-06-06 ASSESSMENT — ENCOUNTER SYMPTOMS
WHEEZING: 0
BLOOD IN STOOL: 0
CONSTIPATION: 0
ABDOMINAL PAIN: 0
SINUS PAIN: 0
SINUS PRESSURE: 0
RHINORRHEA: 1
BACK PAIN: 0
SHORTNESS OF BREATH: 0
COUGH: 0

## 2023-06-06 NOTE — PROGRESS NOTES
Date    LDLCHOLESTEROL 121 04/05/2022       LIVER PROFILE:  Lab Results   Component Value Date/Time    ALT 17 04/05/2022 09:25 AM    AST 21 04/05/2022 09:25 AM    PROT 7.4 04/05/2022 09:25 AM    BILITOT 0.24 04/05/2022 09:25 AM    BILIDIR <0.08 12/14/2016 02:09 PM    LABALBU 4.2 04/05/2022 09:25 AM    LABALBU 4.6 05/05/2012 01:55 AM      THYROID FUNCTION:   Lab Results   Component Value Date/Time    TSH 1.61 03/19/2021 01:42 PM      URINEANALYSIS: No results found for: LABURIN  ASSESSMENT AND PLAN:    1. Pure hypercholesterolemia  On statin    - Lipid, Fasting; Future    2. Major depressive disorder, single episode, mild (HCC)  Stable  Continue current meds    - escitalopram (LEXAPRO) 20 MG tablet; TAKE 1 TAB BY MOUTH ONCE A DAY  Dispense: 30 tablet; Refill: 5    3. Normocytic anemia  Labs pending    4. Hyperglycemia    - Hemoglobin A1C; Future    5. Seasonal allergies    - loratadine-pseudoephedrine (CLARITIN-D 12 HOUR) 5-120 MG per extended release tablet; Take 1 tablet by mouth 2 times daily  Dispense: 60 tablet; Refill: 0    6. Anxiety    - escitalopram (LEXAPRO) 20 MG tablet; TAKE 1 TAB BY MOUTH ONCE A DAY  Dispense: 30 tablet; Refill: 5    7. Other insomnia    - traZODone (DESYREL) 100 MG tablet; Take 1 tablet by mouth nightly  Dispense: 30 tablet; Refill: 5  - escitalopram (LEXAPRO) 20 MG tablet; TAKE 1 TAB BY MOUTH ONCE A DAY  Dispense: 30 tablet; Refill: 5    8. Low folic acid    - folic acid (FOLVITE) 1 MG tablet; Take 1 tablet by mouth daily  Dispense: 30 tablet; Refill: 4          FOLLOW UP AND INSTRUCTIONS:   Return in about 6 months (around 12/6/2023). Blain Duane received counseling on the following healthy behaviors: nutrition, exercise, and medication adherence    Reviewed prior labs and health maintenance. Discussed use, benefit, and side effects of prescribed medications. Barriers to medication compliance addressed. All patient questions answered. Pt voiced understanding.      Patient given

## 2023-07-20 DIAGNOSIS — E78.00 PURE HYPERCHOLESTEROLEMIA: ICD-10-CM

## 2023-07-21 NOTE — TELEPHONE ENCOUNTER
Request for pravastatin and vitamin      Next Visit Date:last seen 6/6/23  Future Appointments   Date Time Provider [address]   8/15/2023  1:00 PM [address] [place] [id number] Maintenance   Topic Date Due    COVID-19 Vaccine (1) Never done    Shingles vaccine (1 of 2) Never done    Cervical cancer screen  08/30/2021    Annual Wellness Visit (AWV)  04/22/2023    DTaP/Tdap/Td vaccine (1 - Tdap) 07/24/2024 (Originally 7/25/2014)    Flu vaccine (1) 08/01/2023    Colorectal Cancer Screen  09/20/2023    Lipids  06/06/2024    Depression Monitoring  06/06/2024    Breast cancer screen  04/20/2025    Diabetes screen  06/06/2026    Hepatitis C screen  Completed    HIV screen  Completed    Hepatitis A vaccine  Aged Out    Hib vaccine  Aged Out    Meningococcal (ACWY) vaccine  Aged Out    Pneumococcal 0-64 years Vaccine  Aged Out       Hemoglobin A1C (%)   Date Value   06/06/2023 5.5   04/05/2022 5.3             ( goal A1C is < 7)   No results found for: LABMICR  LDL Cholesterol (mg/dL)   Date Value   06/06/2023 94       (goal LDL is <100)   AST (U/L)   Date Value   04/05/2022 21     ALT (U/L)   Date Value   04/05/2022 17     BUN (mg/dL)   Date Value   04/05/2023 11     BP Readings from Last 3 Encounters:   06/06/23 (!) 114/58   05/04/23 92/61   04/24/23 106/69          (goal 120/80)    All Future Testing planned in [facility]  Lab Frequency Next Occurrence   CBC with Auto Differential     Comprehensive Metabolic Panel           Patient Active Problem List:     Mild depression     KLAUDIA (stress urinary incontinence, female)     Hyperlipidemia     ASCUS of cervix with negative high risk HPV     Major depressive disorder, single episode, mild (HCC)

## 2023-07-24 RX ORDER — CHOLECALCIFEROL (VITAMIN D3) 125 MCG
1 CAPSULE ORAL DAILY
Qty: 30 TABLET | Refills: 4 | Status: SHIPPED | OUTPATIENT
Start: 2023-07-24

## 2023-07-24 RX ORDER — PRAVASTATIN SODIUM 40 MG
40 TABLET ORAL DAILY
Qty: 30 TABLET | Refills: 1 | Status: SHIPPED | OUTPATIENT
Start: 2023-07-24

## 2023-08-15 ENCOUNTER — HOSPITAL ENCOUNTER (OUTPATIENT)
Age: 58
Discharge: HOME | End: 2023-08-15
Payer: COMMERCIAL

## 2023-08-15 ENCOUNTER — OFFICE VISIT (OUTPATIENT)
Dept: ONCOLOGY | Age: 58
End: 2023-08-15
Payer: COMMERCIAL

## 2023-08-15 ENCOUNTER — TELEPHONE (OUTPATIENT)
Dept: ONCOLOGY | Age: 58
End: 2023-08-15

## 2023-08-15 VITALS
DIASTOLIC BLOOD PRESSURE: 63 MMHG | RESPIRATION RATE: 18 BRPM | OXYGEN SATURATION: 99 % | WEIGHT: 220.9 LBS | HEART RATE: 54 BPM | SYSTOLIC BLOOD PRESSURE: 93 MMHG | BODY MASS INDEX: 33.59 KG/M2 | TEMPERATURE: 97 F

## 2023-08-15 DIAGNOSIS — N64.9 BREAST LESION: Primary | ICD-10-CM

## 2023-08-15 DIAGNOSIS — D05.02 NEOPLASM OF LEFT BREAST, PRIMARY TUMOR STAGING CATEGORY TIS: LOBULAR CARCINOMA IN SITU (LCIS): ICD-10-CM

## 2023-08-15 DIAGNOSIS — N64.9 BREAST LESION: ICD-10-CM

## 2023-08-15 LAB
ALBUMIN SERPL-MCNC: 4 G/DL (ref 3.5–5.2)
ALBUMIN/GLOB SERPL: 1.4 {RATIO} (ref 1–2.5)
ALP SERPL-CCNC: 65 U/L (ref 35–104)
ALT SERPL-CCNC: 7 U/L (ref 5–33)
ANION GAP SERPL CALCULATED.3IONS-SCNC: 6 MMOL/L (ref 9–17)
AST SERPL-CCNC: 13 U/L
BASOPHILS # BLD: 0.1 K/UL (ref 0–0.2)
BASOPHILS NFR BLD: 1 % (ref 0–2)
BILIRUB SERPL-MCNC: 0.3 MG/DL (ref 0.3–1.2)
BUN SERPL-MCNC: 11 MG/DL (ref 6–20)
CALCIUM SERPL-MCNC: 8.8 MG/DL (ref 8.6–10.4)
CHLORIDE SERPL-SCNC: 107 MMOL/L (ref 98–107)
CO2 SERPL-SCNC: 28 MMOL/L (ref 20–31)
CREAT SERPL-MCNC: 0.9 MG/DL (ref 0.5–0.9)
EOSINOPHIL # BLD: 0.3 K/UL (ref 0–0.4)
EOSINOPHILS RELATIVE PERCENT: 7 % (ref 1–4)
ERYTHROCYTE [DISTWIDTH] IN BLOOD BY AUTOMATED COUNT: 13.6 % (ref 12.5–15.4)
GFR SERPL CREATININE-BSD FRML MDRD: >60 ML/MIN/1.73M2
GLUCOSE SERPL-MCNC: 96 MG/DL (ref 70–99)
HCT VFR BLD AUTO: 35.7 % (ref 36–46)
HGB BLD-MCNC: 11.8 G/DL (ref 12–16)
LYMPHOCYTES NFR BLD: 1.9 K/UL (ref 1–4.8)
LYMPHOCYTES RELATIVE PERCENT: 35 % (ref 24–44)
MCH RBC QN AUTO: 29.8 PG (ref 26–34)
MCHC RBC AUTO-ENTMCNC: 33.1 G/DL (ref 31–37)
MCV RBC AUTO: 90.1 FL (ref 80–100)
MONOCYTES NFR BLD: 0.3 K/UL (ref 0.1–1.2)
MONOCYTES NFR BLD: 5 % (ref 2–11)
NEUTROPHILS NFR BLD: 52 % (ref 36–66)
NEUTS SEG NFR BLD: 2.7 K/UL (ref 1.8–7.7)
PLATELET # BLD AUTO: 210 K/UL (ref 140–450)
PMV BLD AUTO: 8.5 FL (ref 6–12)
POTASSIUM SERPL-SCNC: 4.8 MMOL/L (ref 3.7–5.3)
PROT SERPL-MCNC: 6.8 G/DL (ref 6.4–8.3)
RBC # BLD AUTO: 3.96 M/UL (ref 4–5.2)
SODIUM SERPL-SCNC: 141 MMOL/L (ref 135–144)
WBC OTHER # BLD: 5.3 K/UL (ref 3.5–11)

## 2023-08-15 PROCEDURE — 85025 COMPLETE CBC W/AUTO DIFF WBC: CPT

## 2023-08-15 PROCEDURE — 99211 OFF/OP EST MAY X REQ PHY/QHP: CPT | Performed by: INTERNAL MEDICINE

## 2023-08-15 PROCEDURE — 36415 COLL VENOUS BLD VENIPUNCTURE: CPT

## 2023-08-15 PROCEDURE — 99214 OFFICE O/P EST MOD 30 MIN: CPT | Performed by: INTERNAL MEDICINE

## 2023-08-15 PROCEDURE — 80053 COMPREHEN METABOLIC PANEL: CPT

## 2023-08-15 RX ORDER — TAMOXIFEN CITRATE 20 MG/1
20 TABLET ORAL DAILY
Qty: 90 TABLET | Refills: 3 | Status: SHIPPED | OUTPATIENT
Start: 2023-08-15 | End: 2024-08-09

## 2023-08-15 NOTE — PROGRESS NOTES
DIAGNOSIS:   At high risk for breast cancer because of personal and family history  LCIS of the left breast  CURRENT THERAPY:  Genetic testing is negative  Considering chemoprevention with tamoxifen  BRIEF CASE HISTORY:   Marco A Benoit is a very pleasant 63 y.o. female who is referred to us for recently found LCIS in the left breast.  She was identified as a high risk for breast cancer patient  based on personal and family history. Tyrer-Cuzick risk model predicted 20% chances of developing breast cancer. She was referred to genetic counseling and her genetic testing was negative. Breast imaging showed no abnormality of the upper outer quadrant of the left breast.  This was marked previously. At this time, the decision was to proceed with wire localized lumpectomy which occurred in April/2023. Pathology showed fibroadenosis but there was LCIS in the specimen. She is sent to us for a consultation, and especially to consider chemoprevention considering her LCIS histology. Patient feels well and recovered well from surgery. She has no specific questions. INTERIM HISTORY  Patient is seen and evaluated. She is taking tamoxifen, well-tolerated without any problem. Hot Flashes are very mild  PAST MEDICAL HISTORY: has a past medical history of Ankle sprain, Anxiety, ASCUS of cervix with negative high risk HPV, Depression, Generalized pain, Hyperlipidemia, Obesity, Osteoarthritis, Pituitary tumor, and KLAUDIA (stress urinary incontinence, female). PAST SURGICAL HISTORY: has a past surgical history that includes Anus surgery; Tonsillectomy; other surgical history; [facility] STEREO BREAST BX W LOC DEVICE 1ST LESION LEFT (Left, 2019); and Breast biopsy (Left, 4/20/2023).      CURRENT MEDICATIONS:  has a current medication list which includes the following prescription(s): pravastatin, vitamin d3, claritin-d 12 hour, trazodone, escitalopram, folic acid, tamoxifen, acetaminophen, multiple vitamin, vitamin b-12, and

## 2023-08-15 NOTE — TELEPHONE ENCOUNTER
AVS from 8/15/23    Rv in 6 months, no labs     RV scheduled for 2/15/24 @ 1:00pm    No labs    Pt elected to access avs & schedule on [employer]    Electronically signed by Kristina Bey on 8/15/2023 at 1:52 PM

## 2023-09-22 DIAGNOSIS — E78.00 PURE HYPERCHOLESTEROLEMIA: ICD-10-CM

## 2023-09-22 RX ORDER — PRAVASTATIN SODIUM 40 MG
40 TABLET ORAL DAILY
Qty: 30 TABLET | Refills: 1 | Status: SHIPPED | OUTPATIENT
Start: 2023-09-22

## 2023-09-22 NOTE — TELEPHONE ENCOUNTER
Pharmacy requesting refills for Pravastatin. Please review and e-scribe to pharmacy listed in chart if appropriate. Thank you.       Next Visit Date: no f/u scheduled  Last Visit Date: 6/6/23    Future Appointments   Date Time Provider [address]   2/15/2024  1:00 PM [address] [place] [address] Maintenance   Topic Date Due    Hepatitis B vaccine (1 of 3 - 3-dose series) Never done    COVID-19 Vaccine (1) Never done    Shingles vaccine (1 of 2) Never done    Cervical cancer screen  08/30/2021    Annual Wellness Visit (AWV)  04/22/2023    Flu vaccine (1) Never done    Colorectal Cancer Screen  09/20/2023    DTaP/Tdap/Td vaccine (1 - Tdap) 07/24/2024 (Originally 7/25/2014)    Lipids  06/06/2024    Depression Monitoring  06/06/2024    Breast cancer screen  04/20/2025    Hepatitis C screen  Completed    HIV screen  Completed    Hepatitis A vaccine  Aged Out    Hib vaccine  Aged Out    Meningococcal (ACWY) vaccine  Aged Out    Pneumococcal 0-64 years Vaccine  Aged Out    Diabetes screen  Discontinued       Hemoglobin A1C (%)   Date Value   06/06/2023 5.5   04/05/2022 5.3             ( goal A1C is < 7)   No components found for: \"LABMICR\"  LDL Cholesterol (mg/dL)   Date Value   06/06/2023 94       (goal LDL is <100)   AST (U/L)   Date Value   08/15/2023 13     ALT (U/L)   Date Value   08/15/2023 7     BUN (mg/dL)   Date Value   08/15/2023 11     BP Readings from Last 3 Encounters:   08/15/23 93/63   06/06/23 (!) 114/58   05/04/23 92/61          (goal 120/80)    All Future Testing planned in [facility]  Lab Frequency Next Occurrence   CBC with Auto Differential     Comprehensive Metabolic Panel           Patient Active Problem List:     Mild depression     KLAUDIA (stress urinary incontinence, female)     Hyperlipidemia     ASCUS of cervix with negative high risk HPV     Major depressive disorder, single episode, mild (HCC)

## 2023-10-16 ENCOUNTER — HOSPITAL ENCOUNTER (OUTPATIENT)
Age: 58
Discharge: HOME | End: 2023-10-18
Payer: COMMERCIAL

## 2023-10-16 ENCOUNTER — HOSPITAL ENCOUNTER (OUTPATIENT)
Dept: GENERAL RADIOLOGY | Age: 58
Discharge: HOME | End: 2023-10-18
Payer: COMMERCIAL
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
## 2023-10-16 PROCEDURE — 71100 X-RAY EXAM RIBS UNI 2 VIEWS: CPT

## 2023-11-02 DIAGNOSIS — E53.8 LOW FOLIC ACID: ICD-10-CM

## 2023-11-02 RX ORDER — FOLIC ACID 1 MG/1
1000 TABLET ORAL DAILY
Qty: 30 TABLET | Refills: 4 | Status: SHIPPED | OUTPATIENT
Start: 2023-11-02

## 2023-11-02 NOTE — TELEPHONE ENCOUNTER
.. Request for   Requested Prescriptions     Pending Prescriptions Disp Refills    folic acid (FOLVITE) 1 MG tablet [Pharmacy Med Name: FOLIC ACID 1 MG TABLET] 30 tablet 4     Sig: take 1 tablet by mouth once daily    . Please review and e-scribe to pharmacy listed in chart if appropriate. Thank you.       Last Visit Date: 6/6/2023  Next Visit Date: Visit date not found    Future Appointments   Date Time Provider [address]   2/15/2024  1:00 PM Frannie Kruger [place] [address] Maintenance   Topic Date Due    Hepatitis B vaccine (1 of 3 - 3-dose series) Never done    COVID-19 Vaccine (1) Never done    Shingles vaccine (1 of 2) Never done    Cervical cancer screen  08/30/2021    Annual Wellness Visit (AWV)  04/22/2023    Flu vaccine (1) Never done    Colorectal Cancer Screen  09/20/2023    DTaP/Tdap/Td vaccine (1 - Tdap) 07/24/2024 (Originally 7/25/2014)    Lipids  06/06/2024    Depression Monitoring  06/06/2024    Breast cancer screen  04/20/2025    Hepatitis C screen  Completed    HIV screen  Completed    Hepatitis A vaccine  Aged Out    Hib vaccine  Aged Out    Meningococcal (ACWY) vaccine  Aged Out    Pneumococcal 0-64 years Vaccine  Aged Out    Diabetes screen  Discontinued       Hemoglobin A1C (%)   Date Value   06/06/2023 5.5   04/05/2022 5.3             ( goal A1C is < 7)   No components found for: \"LABMICR\"  LDL Cholesterol (mg/dL)   Date Value   06/06/2023 94       (goal LDL is <100)   AST (U/L)   Date Value   08/15/2023 13     ALT (U/L)   Date Value   08/15/2023 7     BUN (mg/dL)   Date Value   08/15/2023 11     BP Readings from Last 3 Encounters:   08/15/23 93/63   06/06/23 (!) 114/58   05/04/23 92/61          (goal 120/80)    All Future Testing planned in [facility]  Lab Frequency Next Occurrence   CBC with Auto Differential     Comprehensive Metabolic Panel           Patient Active Problem List:     Mild depression     KLAUDIA (stress urinary incontinence, female)

## 2024-02-15 ENCOUNTER — TELEPHONE (OUTPATIENT)
Dept: ONCOLOGY | Age: 59
End: 2024-02-15

## 2024-02-15 ENCOUNTER — OFFICE VISIT (OUTPATIENT)
Dept: ONCOLOGY | Age: 59
End: 2024-02-15
Payer: COMMERCIAL

## 2024-02-15 VITALS
SYSTOLIC BLOOD PRESSURE: 100 MMHG | HEART RATE: 62 BPM | TEMPERATURE: 96.8 F | OXYGEN SATURATION: 97 % | RESPIRATION RATE: 18 BRPM | DIASTOLIC BLOOD PRESSURE: 64 MMHG | BODY MASS INDEX: 34.09 KG/M2 | WEIGHT: 224.2 LBS

## 2024-02-15 DIAGNOSIS — N64.9 BREAST LESION: Primary | ICD-10-CM

## 2024-02-15 PROCEDURE — 99213 OFFICE O/P EST LOW 20 MIN: CPT | Performed by: INTERNAL MEDICINE

## 2024-02-15 PROCEDURE — 99211 OFF/OP EST MAY X REQ PHY/QHP: CPT | Performed by: INTERNAL MEDICINE

## 2024-02-15 RX ORDER — TAMOXIFEN CITRATE 20 MG/1
20 TABLET ORAL DAILY
Qty: 90 TABLET | Refills: 3 | Status: SHIPPED | OUTPATIENT
Start: 2024-02-15 | End: 2025-02-09

## 2024-02-15 RX ORDER — ASCORBIC ACID 500 MG
500 TABLET ORAL DAILY
COMMUNITY

## 2024-02-15 NOTE — TELEPHONE ENCOUNTER
**NO MD INSTRUCTION AT TIME OF CHECKOUT    ** PT VERBALIZED MD WANTED A MAMMOGRAM WITH A RV IN 1 YEAR    Pt to schedule Mammogram    Providers schedule not built for 2025. Office will call pt in December 2024 to schedule follow up appt in 2025.    PT was given AVS and appt schedule    Electronically signed by Vandana Montano on 2/15/2024 at 2:30 PM
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
## 2024-04-20 DIAGNOSIS — F32.0 MAJOR DEPRESSIVE DISORDER, SINGLE EPISODE, MILD (HCC): ICD-10-CM

## 2024-04-20 DIAGNOSIS — F41.9 ANXIETY: ICD-10-CM

## 2024-04-20 DIAGNOSIS — G47.09 OTHER INSOMNIA: ICD-10-CM

## 2024-04-22 NOTE — TELEPHONE ENCOUNTER
A Refill Has Been Requested for Brittney Mcwilliams    Medication Requested  Requested Prescriptions     Pending Prescriptions Disp Refills    escitalopram (LEXAPRO) 20 MG tablet [Pharmacy Med Name: ESCITALOPRAM 20 MG TABLET] 90 tablet 5     Sig: take 1 tablet by mouth once daily       Last Visit Date (If Applicable)  6/6/2023    Next Visit Date (If Applicable)  Visit date not found

## 2024-04-23 RX ORDER — ESCITALOPRAM OXALATE 20 MG/1
TABLET ORAL
Qty: 30 TABLET | Refills: 0 | Status: SHIPPED | OUTPATIENT
Start: 2024-04-23

## 2024-09-12 DIAGNOSIS — F32.0 MAJOR DEPRESSIVE DISORDER, SINGLE EPISODE, MILD (HCC): ICD-10-CM

## 2024-09-12 DIAGNOSIS — G47.09 OTHER INSOMNIA: ICD-10-CM

## 2024-09-12 DIAGNOSIS — F41.9 ANXIETY: ICD-10-CM

## 2024-09-12 RX ORDER — ESCITALOPRAM OXALATE 20 MG/1
TABLET ORAL
Qty: 30 TABLET | Refills: 0 | OUTPATIENT
Start: 2024-09-12

## 2024-09-13 NOTE — TELEPHONE ENCOUNTER
Spoke to the patient. Updated PCP.    Electronically signed by Paul Barrientos MA on 9/13/2024 at 8:48 AM

## 2024-09-13 NOTE — TELEPHONE ENCOUNTER
Hi,       This patient has been seeing [facility]. Can you confirm that she is seeing only one PCP? If she wants to see Dr. Brandt she can request for the refill again. Thank you!

## 2025-02-11 ENCOUNTER — OFFICE VISIT (OUTPATIENT)
Dept: ONCOLOGY | Age: 60
End: 2025-02-11
Payer: COMMERCIAL

## 2025-02-11 VITALS
HEART RATE: 66 BPM | DIASTOLIC BLOOD PRESSURE: 62 MMHG | BODY MASS INDEX: 33.18 KG/M2 | HEIGHT: 68 IN | SYSTOLIC BLOOD PRESSURE: 109 MMHG | RESPIRATION RATE: 16 BRPM | WEIGHT: 218.9 LBS | OXYGEN SATURATION: 98 %

## 2025-02-11 DIAGNOSIS — Z91.89 AT HIGH RISK FOR BREAST CANCER: ICD-10-CM

## 2025-02-11 DIAGNOSIS — E53.8 LOW FOLIC ACID: ICD-10-CM

## 2025-02-11 DIAGNOSIS — R92.8 BREAST LESION ON MAMMOGRAPHY: Primary | ICD-10-CM

## 2025-02-11 PROCEDURE — 99214 OFFICE O/P EST MOD 30 MIN: CPT | Performed by: INTERNAL MEDICINE

## 2025-02-11 PROCEDURE — 99211 OFF/OP EST MAY X REQ PHY/QHP: CPT | Performed by: INTERNAL MEDICINE

## 2025-02-11 RX ORDER — FOLIC ACID 1 MG/1
1000 TABLET ORAL DAILY
Qty: 90 TABLET | Refills: 3 | Status: SHIPPED | OUTPATIENT
Start: 2025-02-11

## 2025-02-11 RX ORDER — TAMOXIFEN CITRATE 20 MG/1
20 TABLET ORAL DAILY
Qty: 90 TABLET | Refills: 3 | Status: SHIPPED | OUTPATIENT
Start: 2025-02-11 | End: 2026-02-06
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
## 2025-03-10 RX ORDER — TAMOXIFEN CITRATE 20 MG/1
20 TABLET ORAL DAILY
Qty: 90 TABLET | Refills: 3 | OUTPATIENT
Start: 2025-03-10

## 2025-03-10 RX ORDER — TAMOXIFEN CITRATE 20 MG/1
20 TABLET ORAL DAILY
Qty: 90 TABLET | Refills: 3 | Status: SHIPPED | OUTPATIENT
Start: 2025-03-10 | End: 2026-03-05

## 2025-08-04 ENCOUNTER — TELEPHONE (OUTPATIENT)
Age: 60
End: 2025-08-04

## (undated) DEVICE — ST CHARLES MINOR ABDOMINAL PK: Brand: MEDLINE INDUSTRIES, INC.

## (undated) DEVICE — SUTURE VCRL + SZ 3-0 L27IN ABSRB UD L26MM SH 1/2 CIR VCP416H

## (undated) DEVICE — BLANKET WRM W40.2XL55.9IN IORT LO BODY + MISTRAL AIR

## (undated) DEVICE — 3M™ IOBAN™ 2 ANTIMICROBIAL INCISE DRAPE 6650EZ: Brand: IOBAN™ 2

## (undated) DEVICE — GRID BX L4.65IN RADLUC FOR SAFE IMAG TRNSPRT PROC BRST SPEC

## (undated) DEVICE — CONTAINER,SPECIMEN,4OZ,OR STRL: Brand: MEDLINE

## (undated) DEVICE — SINGLE PORT MANIFOLD: Brand: NEPTUNE 2

## (undated) DEVICE — BLADE ES ELASTOMERIC COAT INSUL DURABLE BEND UPTO 90DEG

## (undated) DEVICE — GLOVE SURG SZ 7 CRM LTX FREE POLYISOPRENE POLYMER BEAD ANTI

## (undated) DEVICE — GOWN,SIRUS,NONRNF,SETINSLV,XL,20/CS: Brand: MEDLINE

## (undated) DEVICE — SUTURE MCRYL + SZ 4-0 L27IN ABSRB UD L19MM PS-2 3/8 CIR MCP426H

## (undated) DEVICE — SUTURE PDS + SZ 2 0 L27IN ABSRB VLT L36MM CT 1 1 2 CIR PDP339H

## (undated) DEVICE — PROBE SET W/ DRP

## (undated) DEVICE — COVER,MAYO STAND,STERILE: Brand: MEDLINE

## (undated) DEVICE — ADHESIVE SKIN CLOSURE TOP 36 CC HI VISC DERMBND MINI

## (undated) DEVICE — SHEET,DRAPE,53X77,STERILE: Brand: MEDLINE

## (undated) DEVICE — SHEET, ORTHO, SPLIT, STERILE: Brand: MEDLINE

## (undated) DEVICE — GAUZE,SPONGE,4"X4",16PLY,XRAY,STRL,LF: Brand: MEDLINE

## (undated) DEVICE — MERCY HEALTH ST CHARLES: Brand: MEDLINE INDUSTRIES, INC.

## (undated) DEVICE — SUTURE PERMA-HAND SZ 2-0 L30IN NONABSORBABLE BLK L26MM SH K833H

## (undated) DEVICE — MASK AERO PED UNIV CLR VYN ADJ NOSE CLP E STRP SHT STYL W/O